# Patient Record
Sex: FEMALE | Race: WHITE | NOT HISPANIC OR LATINO | Employment: PART TIME | URBAN - METROPOLITAN AREA
[De-identification: names, ages, dates, MRNs, and addresses within clinical notes are randomized per-mention and may not be internally consistent; named-entity substitution may affect disease eponyms.]

---

## 2018-09-26 ENCOUNTER — HOSPITAL ENCOUNTER (OUTPATIENT)
Dept: CARDIOLOGY | Facility: HOSPITAL | Age: 68
Discharge: HOME | End: 2018-09-26

## 2018-10-15 ENCOUNTER — HOSPITAL ENCOUNTER (OUTPATIENT)
Dept: MAMMOGRAPHY | Facility: HOSPITAL | Age: 68
Discharge: HOME | End: 2018-10-15

## 2019-10-07 ENCOUNTER — TRANSCRIBE ORDERS (OUTPATIENT)
Dept: ADMINISTRATIVE | Facility: HOSPITAL | Age: 69
End: 2019-10-07

## 2019-10-07 DIAGNOSIS — Z12.39 SCREENING BREAST EXAMINATION: Primary | ICD-10-CM

## 2019-10-21 ENCOUNTER — APPOINTMENT (OUTPATIENT)
Dept: MAMMOGRAPHY | Facility: HOSPITAL | Age: 69
End: 2019-10-21

## 2019-10-21 ENCOUNTER — HOSPITAL ENCOUNTER (OUTPATIENT)
Dept: MAMMOGRAPHY | Facility: HOSPITAL | Age: 69
End: 2019-10-21

## 2019-11-04 ENCOUNTER — APPOINTMENT (OUTPATIENT)
Dept: MAMMOGRAPHY | Facility: HOSPITAL | Age: 69
End: 2019-11-04

## 2019-11-15 ENCOUNTER — HOSPITAL ENCOUNTER (OUTPATIENT)
Dept: MAMMOGRAPHY | Facility: HOSPITAL | Age: 69
Discharge: HOME | End: 2019-11-15
Admitting: INTERNAL MEDICINE

## 2019-11-15 DIAGNOSIS — Z12.39 SCREENING BREAST EXAMINATION: ICD-10-CM

## 2019-11-15 PROCEDURE — 77067 SCR MAMMO BI INCL CAD: CPT

## 2019-11-15 PROCEDURE — 77063 BREAST TOMOSYNTHESIS BI: CPT

## 2019-11-20 ENCOUNTER — TRANSCRIBE ORDERS (OUTPATIENT)
Dept: ADMINISTRATIVE | Facility: HOSPITAL | Age: 69
End: 2019-11-20

## 2019-11-20 DIAGNOSIS — M81.0 SENILE OSTEOPOROSIS: Primary | ICD-10-CM

## 2019-12-02 ENCOUNTER — HOSPITAL ENCOUNTER (OUTPATIENT)
Dept: BONE DENSITY | Facility: HOSPITAL | Age: 69
Discharge: HOME | End: 2019-12-02
Admitting: INTERNAL MEDICINE

## 2019-12-02 DIAGNOSIS — M81.0 SENILE OSTEOPOROSIS: ICD-10-CM

## 2019-12-02 PROCEDURE — 77080 DXA BONE DENSITY AXIAL: CPT

## 2020-02-06 ENCOUNTER — APPOINTMENT (OUTPATIENT)
Dept: CT IMAGING | Facility: HOSPITAL | Age: 70
End: 2020-02-06

## 2020-02-06 ENCOUNTER — HOSPITAL ENCOUNTER (INPATIENT)
Facility: HOSPITAL | Age: 70
LOS: 2 days | Discharge: HOME | End: 2020-02-08
Attending: INTERNAL MEDICINE | Admitting: INTERNAL MEDICINE

## 2020-02-06 ENCOUNTER — APPOINTMENT (OUTPATIENT)
Dept: GENERAL RADIOLOGY | Facility: HOSPITAL | Age: 70
End: 2020-02-06

## 2020-02-06 DIAGNOSIS — E22.2 SIADH (SYNDROME OF INAPPROPRIATE ADH PRODUCTION) (HCC): Primary | Chronic | ICD-10-CM

## 2020-02-06 DIAGNOSIS — J21.0 RSV (ACUTE BRONCHIOLITIS DUE TO RESPIRATORY SYNCYTIAL VIRUS): ICD-10-CM

## 2020-02-06 DIAGNOSIS — E87.1 HYPONATREMIA: ICD-10-CM

## 2020-02-06 DIAGNOSIS — R05.9 COUGH: ICD-10-CM

## 2020-02-06 DIAGNOSIS — R06.00 DYSPNEA, UNSPECIFIED TYPE: ICD-10-CM

## 2020-02-06 PROBLEM — M81.0 OSTEOPOROSIS: Status: ACTIVE | Noted: 2020-02-06

## 2020-02-06 LAB
ALBUMIN SERPL-MCNC: 4.6 G/DL (ref 3.5–5.2)
ALBUMIN/GLOB SERPL: 1.6 G/DL
ALP SERPL-CCNC: 78 U/L (ref 39–117)
ALT SERPL W P-5'-P-CCNC: 18 U/L (ref 1–33)
ANION GAP SERPL CALCULATED.3IONS-SCNC: 12 MMOL/L (ref 5–15)
ANION GAP SERPL CALCULATED.3IONS-SCNC: 13 MMOL/L (ref 5–15)
ARTERIAL PATENCY WRIST A: ABNORMAL
AST SERPL-CCNC: 39 U/L (ref 1–32)
ATMOSPHERIC PRESS: ABNORMAL MM[HG]
B PERT DNA SPEC QL NAA+PROBE: NOT DETECTED
BACTERIA UR QL AUTO: ABNORMAL /HPF
BASE EXCESS BLDA CALC-SCNC: -1.2 MMOL/L (ref 0–3)
BASOPHILS # BLD AUTO: 0 10*3/MM3 (ref 0–0.2)
BASOPHILS NFR BLD AUTO: 0.2 % (ref 0–1.5)
BDY SITE: ABNORMAL
BILIRUB SERPL-MCNC: 0.3 MG/DL (ref 0.2–1.2)
BILIRUB UR QL STRIP: NEGATIVE
BUN BLD-MCNC: 12 MG/DL (ref 8–23)
BUN BLD-MCNC: 12 MG/DL (ref 8–23)
BUN/CREAT SERPL: 18.8 (ref 7–25)
BUN/CREAT SERPL: 20.7 (ref 7–25)
C PNEUM DNA NPH QL NAA+NON-PROBE: NOT DETECTED
CALCIUM SPEC-SCNC: 8.8 MG/DL (ref 8.6–10.5)
CALCIUM SPEC-SCNC: 9.6 MG/DL (ref 8.6–10.5)
CHLORIDE SERPL-SCNC: 85 MMOL/L (ref 98–107)
CHLORIDE SERPL-SCNC: 91 MMOL/L (ref 98–107)
CLARITY UR: CLEAR
CO2 BLDA-SCNC: 24.6 MMOL/L (ref 22–29)
CO2 SERPL-SCNC: 23 MMOL/L (ref 22–29)
CO2 SERPL-SCNC: 25 MMOL/L (ref 22–29)
COLOR UR: YELLOW
CORTIS SERPL-MCNC: 12.03 MCG/DL
CREAT BLD-MCNC: 0.58 MG/DL (ref 0.57–1)
CREAT BLD-MCNC: 0.64 MG/DL (ref 0.57–1)
CRP SERPL-MCNC: 2.82 MG/DL (ref 0–0.5)
D-LACTATE SERPL-SCNC: 1.1 MMOL/L (ref 0.5–2)
DEPRECATED RDW RBC AUTO: 45.5 FL (ref 37–54)
EOSINOPHIL # BLD AUTO: 0 10*3/MM3 (ref 0–0.4)
EOSINOPHIL NFR BLD AUTO: 0 % (ref 0.3–6.2)
ERYTHROCYTE [DISTWIDTH] IN BLOOD BY AUTOMATED COUNT: 13.1 % (ref 12.3–15.4)
FLUAV H1 2009 PAND RNA NPH QL NAA+PROBE: NOT DETECTED
FLUAV H1 HA GENE NPH QL NAA+PROBE: NOT DETECTED
FLUAV H3 RNA NPH QL NAA+PROBE: NOT DETECTED
FLUAV SUBTYP SPEC NAA+PROBE: NOT DETECTED
FLUBV RNA ISLT QL NAA+PROBE: NOT DETECTED
GFR SERPL CREATININE-BSD FRML MDRD: 102 ML/MIN/1.73
GFR SERPL CREATININE-BSD FRML MDRD: 91 ML/MIN/1.73
GLOBULIN UR ELPH-MCNC: 2.9 GM/DL
GLUCOSE BLD-MCNC: 118 MG/DL (ref 65–99)
GLUCOSE BLD-MCNC: 171 MG/DL (ref 65–99)
GLUCOSE UR STRIP-MCNC: NEGATIVE MG/DL
HADV DNA SPEC NAA+PROBE: NOT DETECTED
HCO3 BLDA-SCNC: 23.4 MMOL/L (ref 21–28)
HCOV 229E RNA SPEC QL NAA+PROBE: NOT DETECTED
HCOV HKU1 RNA SPEC QL NAA+PROBE: NOT DETECTED
HCOV NL63 RNA SPEC QL NAA+PROBE: NOT DETECTED
HCOV OC43 RNA SPEC QL NAA+PROBE: NOT DETECTED
HCT VFR BLD AUTO: 41.4 % (ref 34–46.6)
HEMODILUTION: NO
HGB BLD-MCNC: 14.4 G/DL (ref 12–15.9)
HGB UR QL STRIP.AUTO: ABNORMAL
HMPV RNA NPH QL NAA+NON-PROBE: NOT DETECTED
HOROWITZ INDEX BLD+IHG-RTO: 28 %
HPIV1 RNA SPEC QL NAA+PROBE: NOT DETECTED
HPIV2 RNA SPEC QL NAA+PROBE: NOT DETECTED
HPIV3 RNA NPH QL NAA+PROBE: NOT DETECTED
HPIV4 P GENE NPH QL NAA+PROBE: NOT DETECTED
HYALINE CASTS UR QL AUTO: ABNORMAL /LPF
KETONES UR QL STRIP: ABNORMAL
L PNEUMO1 AG UR QL IA: NEGATIVE
LEUKOCYTE ESTERASE UR QL STRIP.AUTO: NEGATIVE
LYMPHOCYTES # BLD AUTO: 0.6 10*3/MM3 (ref 0.7–3.1)
LYMPHOCYTES NFR BLD AUTO: 4.4 % (ref 19.6–45.3)
M PNEUMO IGG SER IA-ACNC: NOT DETECTED
MCH RBC QN AUTO: 34.3 PG (ref 26.6–33)
MCHC RBC AUTO-ENTMCNC: 34.7 G/DL (ref 31.5–35.7)
MCV RBC AUTO: 98.9 FL (ref 79–97)
MODALITY: ABNORMAL
MONOCYTES # BLD AUTO: 1.2 10*3/MM3 (ref 0.1–0.9)
MONOCYTES NFR BLD AUTO: 9.5 % (ref 5–12)
NEUTROPHILS # BLD AUTO: 10.8 10*3/MM3 (ref 1.7–7)
NEUTROPHILS NFR BLD AUTO: 85.9 % (ref 42.7–76)
NITRITE UR QL STRIP: NEGATIVE
NRBC BLD AUTO-RTO: 0 /100 WBC (ref 0–0.2)
NT-PROBNP SERPL-MCNC: 852.9 PG/ML (ref 5–900)
OSMOLALITY SERPL: 260 MOSM/KG (ref 280–300)
OSMOLALITY UR: 314 MOSM/KG (ref 300–800)
PCO2 BLDA: 38.1 MM HG (ref 35–48)
PH BLDA: 7.4 PH UNITS (ref 7.35–7.45)
PH UR STRIP.AUTO: 6.5 [PH] (ref 5–8)
PLATELET # BLD AUTO: 307 10*3/MM3 (ref 140–450)
PMV BLD AUTO: 6.9 FL (ref 6–12)
PO2 BLDA: 81.9 MM HG (ref 83–108)
POTASSIUM BLD-SCNC: 4.1 MMOL/L (ref 3.5–5.2)
POTASSIUM BLD-SCNC: 4.2 MMOL/L (ref 3.5–5.2)
PROCALCITONIN SERPL-MCNC: 0.09 NG/ML (ref 0.1–0.25)
PROT SERPL-MCNC: 7.5 G/DL (ref 6–8.5)
PROT UR QL STRIP: ABNORMAL
RBC # BLD AUTO: 4.19 10*6/MM3 (ref 3.77–5.28)
RBC # UR: ABNORMAL /HPF
REF LAB TEST METHOD: ABNORMAL
RHINOVIRUS RNA SPEC NAA+PROBE: NOT DETECTED
RSV RNA NPH QL NAA+NON-PROBE: DETECTED
SAO2 % BLDCOA: 96 % (ref 94–98)
SODIUM BLD-SCNC: 122 MMOL/L (ref 136–145)
SODIUM BLD-SCNC: 127 MMOL/L (ref 136–145)
SODIUM UR-SCNC: 28 MMOL/L
SP GR UR STRIP: 1.01 (ref 1–1.03)
SQUAMOUS #/AREA URNS HPF: ABNORMAL /HPF
TROPONIN T SERPL-MCNC: <0.01 NG/ML (ref 0–0.03)
TROPONIN T SERPL-MCNC: <0.01 NG/ML (ref 0–0.03)
TSH SERPL DL<=0.05 MIU/L-ACNC: 0.59 UIU/ML (ref 0.27–4.2)
URATE SERPL-MCNC: 1.7 MG/DL (ref 2.4–5.7)
UROBILINOGEN UR QL STRIP: ABNORMAL
WBC NRBC COR # BLD: 12.6 10*3/MM3 (ref 3.4–10.8)
WBC UR QL AUTO: ABNORMAL /HPF

## 2020-02-06 PROCEDURE — 25010000002 AZITHROMYCIN PER 500 MG: Performed by: PHYSICIAN ASSISTANT

## 2020-02-06 PROCEDURE — 84145 PROCALCITONIN (PCT): CPT | Performed by: PHYSICIAN ASSISTANT

## 2020-02-06 PROCEDURE — 82803 BLOOD GASES ANY COMBINATION: CPT

## 2020-02-06 PROCEDURE — 84550 ASSAY OF BLOOD/URIC ACID: CPT | Performed by: INTERNAL MEDICINE

## 2020-02-06 PROCEDURE — 83935 ASSAY OF URINE OSMOLALITY: CPT | Performed by: PHYSICIAN ASSISTANT

## 2020-02-06 PROCEDURE — 81001 URINALYSIS AUTO W/SCOPE: CPT | Performed by: INTERNAL MEDICINE

## 2020-02-06 PROCEDURE — 93005 ELECTROCARDIOGRAM TRACING: CPT | Performed by: PHYSICIAN ASSISTANT

## 2020-02-06 PROCEDURE — 84484 ASSAY OF TROPONIN QUANT: CPT | Performed by: PHYSICIAN ASSISTANT

## 2020-02-06 PROCEDURE — 99223 1ST HOSP IP/OBS HIGH 75: CPT | Performed by: INTERNAL MEDICINE

## 2020-02-06 PROCEDURE — 36600 WITHDRAWAL OF ARTERIAL BLOOD: CPT

## 2020-02-06 PROCEDURE — 94799 UNLISTED PULMONARY SVC/PX: CPT

## 2020-02-06 PROCEDURE — 84443 ASSAY THYROID STIM HORMONE: CPT | Performed by: INTERNAL MEDICINE

## 2020-02-06 PROCEDURE — 84165 PROTEIN E-PHORESIS SERUM: CPT | Performed by: INTERNAL MEDICINE

## 2020-02-06 PROCEDURE — 71250 CT THORAX DX C-: CPT

## 2020-02-06 PROCEDURE — 83930 ASSAY OF BLOOD OSMOLALITY: CPT | Performed by: INTERNAL MEDICINE

## 2020-02-06 PROCEDURE — 87040 BLOOD CULTURE FOR BACTERIA: CPT

## 2020-02-06 PROCEDURE — 87899 AGENT NOS ASSAY W/OPTIC: CPT | Performed by: INTERNAL MEDICINE

## 2020-02-06 PROCEDURE — 25010000002 METHYLPREDNISOLONE PER 125 MG: Performed by: PHYSICIAN ASSISTANT

## 2020-02-06 PROCEDURE — 93005 ELECTROCARDIOGRAM TRACING: CPT | Performed by: INTERNAL MEDICINE

## 2020-02-06 PROCEDURE — 99284 EMERGENCY DEPT VISIT MOD MDM: CPT

## 2020-02-06 PROCEDURE — 83605 ASSAY OF LACTIC ACID: CPT

## 2020-02-06 PROCEDURE — 84300 ASSAY OF URINE SODIUM: CPT | Performed by: PHYSICIAN ASSISTANT

## 2020-02-06 PROCEDURE — 71045 X-RAY EXAM CHEST 1 VIEW: CPT

## 2020-02-06 PROCEDURE — 85025 COMPLETE CBC W/AUTO DIFF WBC: CPT

## 2020-02-06 PROCEDURE — 0099U HC BIOFIRE FILMARRAY RESP PANEL 1: CPT | Performed by: PHYSICIAN ASSISTANT

## 2020-02-06 PROCEDURE — 94640 AIRWAY INHALATION TREATMENT: CPT

## 2020-02-06 PROCEDURE — 82533 TOTAL CORTISOL: CPT | Performed by: INTERNAL MEDICINE

## 2020-02-06 PROCEDURE — 25010000002 CEFTRIAXONE PER 250 MG: Performed by: PHYSICIAN ASSISTANT

## 2020-02-06 PROCEDURE — 80053 COMPREHEN METABOLIC PANEL: CPT

## 2020-02-06 PROCEDURE — 83880 ASSAY OF NATRIURETIC PEPTIDE: CPT | Performed by: PHYSICIAN ASSISTANT

## 2020-02-06 PROCEDURE — 86140 C-REACTIVE PROTEIN: CPT | Performed by: PHYSICIAN ASSISTANT

## 2020-02-06 PROCEDURE — 93005 ELECTROCARDIOGRAM TRACING: CPT

## 2020-02-06 RX ORDER — CHOLECALCIFEROL (VITAMIN D3) 125 MCG
500 CAPSULE ORAL DAILY
COMMUNITY

## 2020-02-06 RX ORDER — MAGNESIUM SULFATE HEPTAHYDRATE 40 MG/ML
2 INJECTION, SOLUTION INTRAVENOUS AS NEEDED
Status: DISCONTINUED | OUTPATIENT
Start: 2020-02-06 | End: 2020-02-06

## 2020-02-06 RX ORDER — CALCIUM CARBONATE 200(500)MG
2 TABLET,CHEWABLE ORAL 2 TIMES DAILY PRN
Status: DISCONTINUED | OUTPATIENT
Start: 2020-02-06 | End: 2020-02-08 | Stop reason: HOSPADM

## 2020-02-06 RX ORDER — GUAIFENESIN AND CODEINE PHOSPHATE 100; 10 MG/5ML; MG/5ML
5 SOLUTION ORAL EVERY 4 HOURS PRN
Status: DISCONTINUED | OUTPATIENT
Start: 2020-02-06 | End: 2020-02-08 | Stop reason: HOSPADM

## 2020-02-06 RX ORDER — DOCUSATE SODIUM 100 MG/1
100 CAPSULE, LIQUID FILLED ORAL 2 TIMES DAILY PRN
Status: DISCONTINUED | OUTPATIENT
Start: 2020-02-06 | End: 2020-02-08 | Stop reason: HOSPADM

## 2020-02-06 RX ORDER — SODIUM CHLORIDE 0.9 % (FLUSH) 0.9 %
10 SYRINGE (ML) INJECTION AS NEEDED
Status: DISCONTINUED | OUTPATIENT
Start: 2020-02-06 | End: 2020-02-08 | Stop reason: HOSPADM

## 2020-02-06 RX ORDER — ONDANSETRON 2 MG/ML
4 INJECTION INTRAMUSCULAR; INTRAVENOUS EVERY 6 HOURS PRN
Status: DISCONTINUED | OUTPATIENT
Start: 2020-02-06 | End: 2020-02-08 | Stop reason: HOSPADM

## 2020-02-06 RX ORDER — BENZONATATE 100 MG/1
100 CAPSULE ORAL 3 TIMES DAILY PRN
Status: DISCONTINUED | OUTPATIENT
Start: 2020-02-06 | End: 2020-02-06

## 2020-02-06 RX ORDER — BENZONATATE 100 MG/1
200 CAPSULE ORAL 4 TIMES DAILY
Status: DISCONTINUED | OUTPATIENT
Start: 2020-02-06 | End: 2020-02-08 | Stop reason: HOSPADM

## 2020-02-06 RX ORDER — PREDNISONE 10 MG/1
10 TABLET ORAL TAKE AS DIRECTED
Status: ON HOLD | COMMUNITY
End: 2020-02-08 | Stop reason: SDUPTHER

## 2020-02-06 RX ORDER — BISACODYL 10 MG
10 SUPPOSITORY, RECTAL RECTAL DAILY PRN
Status: DISCONTINUED | OUTPATIENT
Start: 2020-02-06 | End: 2020-02-08 | Stop reason: HOSPADM

## 2020-02-06 RX ORDER — ALUMINA, MAGNESIA, AND SIMETHICONE 2400; 2400; 240 MG/30ML; MG/30ML; MG/30ML
15 SUSPENSION ORAL EVERY 6 HOURS PRN
Status: DISCONTINUED | OUTPATIENT
Start: 2020-02-06 | End: 2020-02-06

## 2020-02-06 RX ORDER — DOXYCYCLINE HYCLATE 100 MG/1
100 CAPSULE ORAL 2 TIMES DAILY
COMMUNITY
End: 2020-02-08 | Stop reason: HOSPADM

## 2020-02-06 RX ORDER — ONDANSETRON 4 MG/1
4 TABLET, FILM COATED ORAL EVERY 6 HOURS PRN
Status: DISCONTINUED | OUTPATIENT
Start: 2020-02-06 | End: 2020-02-08 | Stop reason: HOSPADM

## 2020-02-06 RX ORDER — METHYLPREDNISOLONE SODIUM SUCCINATE 125 MG/2ML
125 INJECTION, POWDER, LYOPHILIZED, FOR SOLUTION INTRAMUSCULAR; INTRAVENOUS ONCE
Status: COMPLETED | OUTPATIENT
Start: 2020-02-06 | End: 2020-02-06

## 2020-02-06 RX ORDER — SODIUM CHLORIDE 0.9 % (FLUSH) 0.9 %
10 SYRINGE (ML) INJECTION EVERY 12 HOURS SCHEDULED
Status: DISCONTINUED | OUTPATIENT
Start: 2020-02-06 | End: 2020-02-08 | Stop reason: HOSPADM

## 2020-02-06 RX ORDER — ACETAMINOPHEN 160 MG/5ML
650 SOLUTION ORAL EVERY 4 HOURS PRN
Status: DISCONTINUED | OUTPATIENT
Start: 2020-02-06 | End: 2020-02-08 | Stop reason: HOSPADM

## 2020-02-06 RX ORDER — POTASSIUM CHLORIDE 20 MEQ/1
40 TABLET, EXTENDED RELEASE ORAL AS NEEDED
Status: DISCONTINUED | OUTPATIENT
Start: 2020-02-06 | End: 2020-02-06

## 2020-02-06 RX ORDER — ALENDRONATE SODIUM 70 MG/1
70 TABLET ORAL WEEKLY
Status: DISCONTINUED | OUTPATIENT
Start: 2020-02-06 | End: 2020-02-08 | Stop reason: HOSPADM

## 2020-02-06 RX ORDER — ALENDRONATE SODIUM 70 MG/1
70 TABLET ORAL
COMMUNITY

## 2020-02-06 RX ORDER — IPRATROPIUM BROMIDE AND ALBUTEROL SULFATE 2.5; .5 MG/3ML; MG/3ML
3 SOLUTION RESPIRATORY (INHALATION) ONCE
Status: COMPLETED | OUTPATIENT
Start: 2020-02-06 | End: 2020-02-06

## 2020-02-06 RX ORDER — CHOLECALCIFEROL (VITAMIN D3) 125 MCG
5 CAPSULE ORAL NIGHTLY PRN
Status: DISCONTINUED | OUTPATIENT
Start: 2020-02-06 | End: 2020-02-08 | Stop reason: HOSPADM

## 2020-02-06 RX ORDER — CITALOPRAM 20 MG/1
40 TABLET ORAL DAILY
Status: DISCONTINUED | OUTPATIENT
Start: 2020-02-06 | End: 2020-02-06

## 2020-02-06 RX ORDER — LORAZEPAM 0.5 MG/1
0.5 TABLET ORAL EVERY 6 HOURS PRN
Status: DISCONTINUED | OUTPATIENT
Start: 2020-02-06 | End: 2020-02-08 | Stop reason: HOSPADM

## 2020-02-06 RX ORDER — PREDNISONE 20 MG/1
40 TABLET ORAL
Status: DISCONTINUED | OUTPATIENT
Start: 2020-02-07 | End: 2020-02-08 | Stop reason: HOSPADM

## 2020-02-06 RX ORDER — IPRATROPIUM BROMIDE AND ALBUTEROL SULFATE 2.5; .5 MG/3ML; MG/3ML
3 SOLUTION RESPIRATORY (INHALATION) EVERY 6 HOURS PRN
Status: DISCONTINUED | OUTPATIENT
Start: 2020-02-06 | End: 2020-02-06

## 2020-02-06 RX ORDER — CITALOPRAM 40 MG/1
40 TABLET ORAL DAILY
COMMUNITY
Start: 2019-11-01 | End: 2020-02-08 | Stop reason: HOSPADM

## 2020-02-06 RX ORDER — MAGNESIUM SULFATE HEPTAHYDRATE 40 MG/ML
4 INJECTION, SOLUTION INTRAVENOUS AS NEEDED
Status: DISCONTINUED | OUTPATIENT
Start: 2020-02-06 | End: 2020-02-06

## 2020-02-06 RX ORDER — HYDRALAZINE HYDROCHLORIDE 20 MG/ML
10 INJECTION INTRAMUSCULAR; INTRAVENOUS EVERY 6 HOURS PRN
Status: DISCONTINUED | OUTPATIENT
Start: 2020-02-06 | End: 2020-02-08 | Stop reason: HOSPADM

## 2020-02-06 RX ORDER — ACETAMINOPHEN 325 MG/1
650 TABLET ORAL EVERY 4 HOURS PRN
Status: DISCONTINUED | OUTPATIENT
Start: 2020-02-06 | End: 2020-02-08 | Stop reason: HOSPADM

## 2020-02-06 RX ORDER — ALBUTEROL SULFATE 2.5 MG/3ML
2.5 SOLUTION RESPIRATORY (INHALATION) ONCE
Status: COMPLETED | OUTPATIENT
Start: 2020-02-06 | End: 2020-02-06

## 2020-02-06 RX ORDER — TOLVAPTAN 15 MG/1
15 TABLET ORAL ONCE
Status: COMPLETED | OUTPATIENT
Start: 2020-02-06 | End: 2020-02-06

## 2020-02-06 RX ORDER — ACETAMINOPHEN 650 MG/1
650 SUPPOSITORY RECTAL EVERY 4 HOURS PRN
Status: DISCONTINUED | OUTPATIENT
Start: 2020-02-06 | End: 2020-02-08 | Stop reason: HOSPADM

## 2020-02-06 RX ORDER — TRAZODONE HYDROCHLORIDE 50 MG/1
50 TABLET ORAL DAILY
COMMUNITY
Start: 2019-11-01 | End: 2020-02-08 | Stop reason: HOSPADM

## 2020-02-06 RX ORDER — IPRATROPIUM BROMIDE AND ALBUTEROL SULFATE 2.5; .5 MG/3ML; MG/3ML
3 SOLUTION RESPIRATORY (INHALATION) EVERY 4 HOURS PRN
Status: DISCONTINUED | OUTPATIENT
Start: 2020-02-06 | End: 2020-02-08 | Stop reason: HOSPADM

## 2020-02-06 RX ORDER — BENZONATATE 100 MG/1
100 CAPSULE ORAL 3 TIMES DAILY PRN
COMMUNITY

## 2020-02-06 RX ORDER — POTASSIUM CHLORIDE 1.5 G/1.77G
40 POWDER, FOR SOLUTION ORAL AS NEEDED
Status: DISCONTINUED | OUTPATIENT
Start: 2020-02-06 | End: 2020-02-06

## 2020-02-06 RX ORDER — HEPARIN SODIUM 5000 [USP'U]/ML
5000 INJECTION, SOLUTION INTRAVENOUS; SUBCUTANEOUS EVERY 12 HOURS SCHEDULED
Status: DISCONTINUED | OUTPATIENT
Start: 2020-02-06 | End: 2020-02-08 | Stop reason: HOSPADM

## 2020-02-06 RX ADMIN — CEFTRIAXONE SODIUM 2 G: 10 INJECTION, POWDER, FOR SOLUTION INTRAVENOUS at 13:30

## 2020-02-06 RX ADMIN — Medication 10 ML: at 21:25

## 2020-02-06 RX ADMIN — METHYLPREDNISOLONE SODIUM SUCCINATE 125 MG: 125 INJECTION, POWDER, FOR SOLUTION INTRAMUSCULAR; INTRAVENOUS at 11:01

## 2020-02-06 RX ADMIN — ALBUTEROL SULFATE 2.5 MG: 2.5 SOLUTION RESPIRATORY (INHALATION) at 13:39

## 2020-02-06 RX ADMIN — TOLVAPTAN 15 MG: 15 TABLET ORAL at 16:00

## 2020-02-06 RX ADMIN — IPRATROPIUM BROMIDE AND ALBUTEROL SULFATE 3 ML: .5; 3 SOLUTION RESPIRATORY (INHALATION) at 10:40

## 2020-02-06 RX ADMIN — CYANOCOBALAMIN TAB 250 MCG 500 MCG: 250 TAB at 17:00

## 2020-02-06 RX ADMIN — OYSTER SHELL CALCIUM WITH VITAMIN D 1 TABLET: 500; 200 TABLET, FILM COATED ORAL at 18:00

## 2020-02-06 RX ADMIN — AZITHROMYCIN MONOHYDRATE 500 MG: 500 INJECTION, POWDER, LYOPHILIZED, FOR SOLUTION INTRAVENOUS at 13:30

## 2020-02-06 RX ADMIN — ACETAMINOPHEN 650 MG: 325 TABLET, FILM COATED ORAL at 19:54

## 2020-02-06 RX ADMIN — ALBUTEROL SULFATE 2.5 MG: 2.5 SOLUTION RESPIRATORY (INHALATION) at 10:40

## 2020-02-06 RX ADMIN — BENZONATATE 200 MG: 100 CAPSULE ORAL at 19:54

## 2020-02-06 RX ADMIN — SODIUM CHLORIDE 1000 ML: 900 INJECTION, SOLUTION INTRAVENOUS at 13:40

## 2020-02-06 NOTE — NURSING NOTE
Post void residual was 20mL.   Pt on strict I & O   Will give in report jazmin that Dr. Andres is to be called with NA results and changes listed under the parameters found in the nursing communication order. [phone]

## 2020-02-06 NOTE — H&P
[facility] Medicine Services    Patient Name: Akila Bates  : 1950  MRN: [id number]  Primary Care Physician: Nikki Rosado  Date of admission: 2020    Patient Care Team:  Nikki Rosado as PCP - General  Ronda Corbin MD as Consulting Physician (Nephrology)    Subjective   History Present Illness     Chief Complaint:   Chief Complaint   Patient presents with   • Shortness of Breath     Ms. Bates is a 69 y.o. with a past medical history of anxiety/depression and HLD who presents to [facility]  complaining of dyspnea and weakness x 1 week, worsening over the last two days.  She states she was seen at urgent care 2 days ago and diagnosed with bronchitis.  She reports a dry cough, but denies known fever or chills. She denies known sick contacts.  She denies home oxygen use.      In the ED, the patient's labs included the following: Na 122, K 4.1, BUN 12, Cr 0.64, glucose 118, WBC 12.6, hgb 14.4, plts 307.  Troponin negative.  .  Lactate negative.  CXR: negative.  EKG: SR, HR 90.  Respiratory panel positive for RSV.  She was given breathing treatments, a dose of rocephin/zithromax and 1 L NS bolus in the ED.  She was admitted for further evaluation and management.     History of Present Illness    Review of Systems   Constitution: Negative for chills and fever.   Cardiovascular: Negative for chest pain.   Respiratory: Positive for shortness of breath.    Gastrointestinal: Negative for nausea and vomiting.   Neurological: Positive for weakness. Negative for dizziness.   All other systems reviewed and are negative.    Personal History     Past Medical History:   Past Medical History:   Diagnosis Date   • Osteoporosis        Surgical History:      Past Surgical History:   Procedure Laterality Date   • HYSTERECTOMY             Family History: family history is not on file. Otherwise pertinent FHx was reviewed and unremarkable.      Social History:  reports that she quit smoking today. Her smoking use included cigarettes. She smoked 1.00 pack per day. She has never used smokeless tobacco. She reports that she drank alcohol. She reports that she does not use drugs.      Medications:  Prior to Admission medications    Medication Sig Start Date End Date Taking? Authorizing Provider   alendronate (FOSAMAX) 70 MG tablet Take 70 mg by mouth Every 7 (Seven) Days. Every Thursday   Yes Germania Tran MD   benzonatate (TESSALON) 100 MG capsule Take 100 mg by mouth 3 (Three) Times a Day As Needed for Cough.   Yes Germania Tran MD   Calcium Carb-Cholecalciferol (CALCIUM-VITAMIN D) 500-200 MG-UNIT per tablet Take 1 tablet by mouth Daily.   Yes Germania Tran MD   citalopram (CeleXA) 40 MG tablet Take 40 mg by mouth Daily. 11/1/19  Yes Germania Tran MD   doxycycline (VIBRAMYCIN) 100 MG capsule Take 100 mg by mouth 2 (Two) Times a Day.   Yes Germania Tran MD   predniSONE (DELTASONE) 10 MG tablet Take 10 mg by mouth Take As Directed.   Yes Germania Tran MD   traZODone (DESYREL) 50 MG tablet Take 50 mg by mouth Daily. 11/1/19  Yes Germania Tran MD   vitamin B-12 (CYANOCOBALAMIN) 500 MCG tablet Take 500 mcg by mouth Daily.   Yes Germania Tran MD       Allergies:  No Known Allergies    Objective   Objective     Vital Signs  Temp:  [98.4 °F (36.9 °C)-98.6 °F (37 °C)] 98.4 °F (36.9 °C)  Heart Rate:  [] 97  Resp:  [24-26] 24  BP: (139-183)/(75-98) 159/82  SpO2:  [88 %-100 %] 98 %  on  Flow (L/min):  [2-3] 3;   Device (Oxygen Therapy): nasal cannula  Body mass index is 22.48 kg/m².    Physical Exam   Constitutional: She is oriented to person, place, and time. She appears well-developed and well-nourished. No distress.   HENT:   Head: Normocephalic and atraumatic.   Eyes: Pupils are equal, round, and reactive to light. EOM are normal.   Neck: Normal range of motion. Neck supple.   Cardiovascular:  Normal rate, regular rhythm and normal heart sounds. Exam reveals no gallop and no friction rub.   No murmur heard.  Pulmonary/Chest: Effort normal. No respiratory distress. She has wheezes (bilateral).   O2 @ 3L via NC   Abdominal: Soft. Bowel sounds are normal. She exhibits no distension. There is no tenderness. There is no guarding.   Musculoskeletal: Normal range of motion. She exhibits no edema or deformity.   Neurological: She is alert and oriented to person, place, and time.   Skin: Skin is warm and dry. She is not diaphoretic.   Psychiatric: She has a normal mood and affect.   Vitals reviewed.    Results Review:    Results from last 7 days   Lab Units 02/06/20  1046   WBC 10*3/mm3 12.60*   HEMOGLOBIN g/dL 14.4   HEMATOCRIT % 41.4   PLATELETS 10*3/mm3 307     Results from last 7 days   Lab Units 02/06/20  1046 02/06/20  1044   SODIUM mmol/L 122*  --    POTASSIUM mmol/L 4.1  --    CHLORIDE mmol/L 85*  --    CO2 mmol/L 25.0  --    BUN mg/dL 12  --    CREATININE mg/dL 0.64  --    GLUCOSE mg/dL 118*  --    CALCIUM mg/dL 9.6  --    ALT (SGPT) U/L 18  --    AST (SGOT) U/L 39*  --    TROPONIN T ng/mL <0.010  --    PROBNP pg/mL 852.9  --    LACTATE mmol/L  --  1.1     Estimated Creatinine Clearance: 55 mL/min (by C-G formula based on SCr of 0.64 mg/dL).  Brief Urine Lab Results  (Last result in the past 365 days)      Color   Clarity   Blood   Leuk Est   Nitrite   Protein   CREAT   Urine HCG        02/06/20 1523 Yellow Clear Small (1+) Negative Negative Trace               Microbiology Results (last 10 days)     Procedure Component Value - Date/Time    Respiratory Panel, PCR - Swab, Nasopharynx [[id number]]  (Abnormal) Collected:  02/06/20 1053    Lab Status:  Final result Specimen:  Swab from Nasopharynx Updated:  02/06/20 1240     ADENOVIRUS, PCR Not Detected     Coronavirus 229E Not Detected     Coronavirus HKU1 Not Detected     Coronavirus NL63 Not Detected     Coronavirus OC43 Not Detected     Human  Metapneumovirus Not Detected     Human Rhinovirus/Enterovirus Not Detected     Influenza B PCR Not Detected     Parainfluenza Virus 1 Not Detected     Parainfluenza Virus 2 Not Detected     Parainfluenza Virus 3 Not Detected     Parainfluenza Virus 4 Not Detected     Bordetella pertussis pcr Not Detected     Influenza A H1 2009 PCR Not Detected     Chlamydophila pneumoniae PCR Not Detected     Mycoplasma pneumo by PCR Not Detected     Influenza A PCR Not Detected     Influenza A H3 Not Detected     Influenza A H1 Not Detected     RSV, PCR Detected          ECG/EMG Results (most recent)     Procedure Component Value Units Date/Time    ECG 12 Lead [[id number]] Collected:  02/06/20 1101     Updated:  02/06/20 1102    Narrative:       HEART RATE= 90  bpm  RR Interval= 668  ms  [place] Interval= 154  ms  P Horizontal Axis= 13  deg  P Front Axis= 37  deg  QRSD Interval= 80  ms  QT Interval= 343  ms  QRS Axis= 52  deg  T Wave Axis= 57  deg  - BORDERLINE ECG -  Sinus rhythm  Consider anterior infarct  No previous ECG available for comparison  Electronically Signed By:   Date and Time of Study: 2020-02-06 11:01:22                    Xr Chest 1 View    Result Date: 2/6/2020  1.No evidence for acute cardiopulmonary process.  Electronically Signed By-Bennie Whiteside On:2/6/2020 12:02 PM This report was finalized on [id number] by  Bennie Whiteside, .        Estimated Creatinine Clearance: 55 mL/min (by C-G formula based on SCr of 0.64 mg/dL).    Assessment/Plan   Assessment/Plan       Active Hospital Problems    Diagnosis  POA   • **RSV (acute bronchiolitis due to respiratory syncytial virus) [J21.0]  Yes     Priority: High   • Hyponatremia [E87.1]  Unknown     Priority: Medium   • Depressive disorder [F32.9]  Yes   • Anxiety state [F41.1]  Yes      Resolved Hospital Problems   No resolved problems to display.     Acute dyspnea   - secondary to RSV   - CXR: negative  - EKG: SR, HR 90  - respiratory panel positive for RSV  - troponin  negative, check serial troponin   - , repeat in AM   - lactate negative   - procalcitonin and CRP pending   - patient given breathing treatments in ED, continue   - patient given Rocephin/Zithromax, do not continue anymore at this time   - IV solu-medrol 125 mg x 1 in ED, continue with prednisone 40 mg daily x 5 days  - regular diet, fluid restriction 1000 cc/day     Recent bronchitis  - continue home tessalon perles   - defer home doxycycline and prednisone     Acute hyponatremia (122)  - etiology unclear   - patient given 1 L NS bolus in ED   - nephrology consulted     Hyperlipidemia  - no home meds reported     Anxiety / Depression   - celexa on hold     VTE Prophylaxis - Heparin, SCDs     Mechanical Order History:      Ordered        02/06/20 1559  Place Sequential Compression Device  Once         02/06/20 1559  Maintain Sequential Compression Device  Continuous                 Pharmalogical Order History:     Ordered     Dose Route Frequency Stop    02/06/20 1553  heparin (porcine) 5000 UNIT/ML injection 5,000 Units      5,000 Units SC Every 12 Hours Scheduled --          CODE STATUS:    Code Status and Medical Interventions:   Ordered at: 02/06/20 1534     Code Status:    CPR     Medical Interventions (Level of Support Prior to Arrest):    Full       Admission Status:  I believe this patient meets inpatient criteria.    I discussed the patient's findings and my recommendations with patient.    Electronically signed by Christine Pineda PA-C, 02/06/20, 4:53 PM.  [facility] Hospitalist Team

## 2020-02-06 NOTE — NURSING NOTE
Pt brought to floor from ER. Very anxious upon assessment. O2 is within normal limits on 2L NC. Pt was provided w/reassurance.

## 2020-02-06 NOTE — CONSULTS
NEPHROLOGY CONSULTATION-----[facility]    [facility]  [phone]  Chris Corbin MD    Patient Care Team:  Nikki Rosado as PCP - Ronda Molina MD as Consulting Physician (Nephrology)    CC/REASON FOR CONSULTATION: HYPONATREMIA  PHYSICIAN REQUESTING CONSULTATION:     History of Present Illness     HPI    Patient is a 69 y.o. WF  whom I was asked to see in consultation for evaluation and management of hyponatremia. Patient was admitted with progressive shortness of breath and cough, and was subsequently found to have severe hyponatremia.  Patient denies prior knowledge of functional kidney disease, proteinuria, or hematuria. No NSAIDs or recent IV dye exposure. No known h/o hepatitis, TB, rheumatic fever, jaundice, SLE, bleeding/bruising disorders.  No urinary sx. No edema or fluid retention.  +Compliance with home meds.  No herbal med use.      Review of Systems   Constitutional: Positive for activity change and fatigue. Negative for appetite change, chills, diaphoresis, fever and unexpected weight change.   HENT: Negative for congestion, dental problem, drooling, ear discharge, ear pain, facial swelling, hearing loss, mouth sores, nosebleeds, postnasal drip, rhinorrhea, sinus pressure, sinus pain, sneezing, sore throat, tinnitus, trouble swallowing and voice change.    Eyes: Negative for photophobia, pain, discharge, redness, itching and visual disturbance.   Respiratory: Positive for cough, shortness of breath and wheezing. Negative for apnea, choking, chest tightness and stridor.    Cardiovascular: Negative for chest pain, palpitations and leg swelling.   Gastrointestinal: Positive for nausea. Negative for abdominal distention, abdominal pain, anal bleeding, blood in stool, constipation, diarrhea, rectal pain and vomiting.   Endocrine: Negative for cold intolerance, heat intolerance, polydipsia, polyphagia and polyuria.    Genitourinary: Negative for decreased urine volume, difficulty urinating, dysuria, enuresis, flank pain, frequency, genital sores, hematuria and urgency.   Musculoskeletal: Positive for arthralgias. Negative for back pain, gait problem, joint swelling, myalgias, neck pain and neck stiffness.   Skin: Negative for color change, pallor, rash and wound.   Allergic/Immunologic: Negative for environmental allergies, food allergies and immunocompromised state.   Neurological: Negative for dizziness, tremors, seizures, syncope, facial asymmetry, speech difficulty, weakness, light-headedness, numbness and headaches.   Hematological: Negative for adenopathy. Does not bruise/bleed easily.   Psychiatric/Behavioral: Positive for agitation. Negative for behavioral problems, confusion, decreased concentration, dysphoric mood, hallucinations, self-injury, sleep disturbance and suicidal ideas. The patient is nervous/anxious. The patient is not hyperactive.           Past Medical History:   Diagnosis Date   • Osteoporosis        Past Surgical History:   Procedure Laterality Date   • HYSTERECTOMY  1988       No family history on file.    Social History     Tobacco Use   • Smoking status: Former Smoker     Packs/day: 1.00     Types: Cigarettes     Last attempt to quit: 2/6/2020   • Smokeless tobacco: Never Used   Substance Use Topics   • Alcohol use: Not Currently   • Drug use: Never       Home Meds:   Medications Prior to Admission   Medication Sig Dispense Refill Last Dose   • alendronate (FOSAMAX) 70 MG tablet Take 70 mg by mouth Every 7 (Seven) Days. Every Thursday   Past Week at Unknown time   • benzonatate (TESSALON) 100 MG capsule Take 100 mg by mouth 3 (Three) Times a Day As Needed for Cough.   2/5/2020 at Unknown time   • Calcium Carb-Cholecalciferol (CALCIUM-VITAMIN D) 500-200 MG-UNIT per tablet Take 1 tablet by mouth Daily.   2/5/2020 at Unknown time   • citalopram (CeleXA) 40 MG tablet Take 40 mg by mouth Daily.    2/5/2020 at Unknown time   • doxycycline (VIBRAMYCIN) 100 MG capsule Take 100 mg by mouth 2 (Two) Times a Day.   2/5/2020 at Unknown time   • predniSONE (DELTASONE) 10 MG tablet Take 10 mg by mouth Take As Directed.   2/5/2020 at Unknown time   • traZODone (DESYREL) 50 MG tablet Take 50 mg by mouth Daily.   2/5/2020 at Unknown time   • vitamin B-12 (CYANOCOBALAMIN) 500 MCG tablet Take 500 mcg by mouth Daily.   2/5/2020 at Unknown time       Scheduled Meds:    Urea 15 g Oral BID       Continuous Infusions:       PRN Meds:  •  sodium chloride    Allergies:  Patient has no known allergies.    OBJECTIVE    Vital Signs  Temp:  [98.4 °F (36.9 °C)-98.6 °F (37 °C)] 98.4 °F (36.9 °C)  Heart Rate:  [] 97  Resp:  [24-26] 24  BP: (139-183)/(75-98) 159/82    I/O this shift:  In: -   Out: 200 [Urine:200]  No intake/output data recorded.    Physical Exam:  General Appearance: alert, appears stated age and cooperative  Head: normocephalic, without obvious abnormality and atraumatic  Eyes: conjunctivae and sclerae normal and no icterus  Neck: supple and no JVD  Lungs: +SCATTERED RHONCHI AND END-EXP WHEEZES  Heart: regular rhythm & normal rate and normal S1, S2 +JATINDER  Chest Wall: no abnormalities observed  Abdomen: normal bowel sounds and soft non-tender  Extremities: moves extremities well, no edema, no cyanosis and no redness +DJD  Skin: no bleeding, bruising or rash  Neurologic: Alert, and oriented. No focal deficits    Results Review:    I reviewed the patient's new clinical results.    WBC WBC   Date Value Ref Range Status   02/06/2020 12.60 (H) 3.40 - 10.80 10*3/mm3 Final      HGB Hemoglobin   Date Value Ref Range Status   02/06/2020 14.4 12.0 - 15.9 g/dL Final      HCT Hematocrit   Date Value Ref Range Status   02/06/2020 41.4 34.0 - 46.6 % Final      Platlets No results found for: LABPLAT   MCV MCV   Date Value Ref Range Status   02/06/2020 98.9 (H) 79.0 - 97.0 fL Final          Sodium Sodium   Date Value Ref Range  Status   02/06/2020 122 (L) 136 - 145 mmol/L Final      Potassium Potassium   Date Value Ref Range Status   02/06/2020 4.1 3.5 - 5.2 mmol/L Final      Chloride Chloride   Date Value Ref Range Status   02/06/2020 85 (L) 98 - 107 mmol/L Final      CO2 CO2   Date Value Ref Range Status   02/06/2020 25.0 22.0 - 29.0 mmol/L Final      BUN BUN   Date Value Ref Range Status   02/06/2020 12 8 - 23 mg/dL Final      Creatinine Creatinine   Date Value Ref Range Status   02/06/2020 0.64 0.57 - 1.00 mg/dL Final      Calcium Calcium   Date Value Ref Range Status   02/06/2020 9.6 8.6 - 10.5 mg/dL Final      PO4 No results found for: CAPO4   Albumin Albumin   Date Value Ref Range Status   02/06/2020 4.60 3.50 - 5.20 g/dL Final      Magnesium No results found for: MG   Uric Acid Uric Acid   Date Value Ref Range Status   02/06/2020 1.7 (L) 2.4 - 5.7 mg/dL Final          Imaging Results (Last 72 Hours)     Procedure Component Value Units Date/Time    XR Chest 1 View [[id number]] Collected:  02/06/20 1201     Updated:  02/06/20 1209    Narrative:       XR CHEST 1 VW-     Date of Exam: 2/6/2020 11:22 AM     Indication: Simple Sepsis Triage Protocol.  Wheezing. Chest pain.  Shortness of breath. Intermittent cough.      Comparison Exams: None available.     Technique: Portable AP view of the chest     FINDINGS:  No consolidations or pleural effusions are observed. Chronic appearing  changes are noted. The cardiac silhouette is within normal limits for  size. The mediastinum is unremarkable. No acute osseous abnormalities  are seen.       Impression:       1.No evidence for acute cardiopulmonary process.     Electronically Signed By-Bennie Whiteside On:2/6/2020 12:02 PM  This report was finalized on [id number] by  Bennie Whiteside, .            Results for orders placed during the hospital encounter of 02/06/20   XR Chest 1 View    Narrative XR CHEST 1 VW-     Date of Exam: 2/6/2020 11:22 AM     Indication: Simple Sepsis Triage Protocol.   Wheezing. Chest pain.  Shortness of breath. Intermittent cough.      Comparison Exams: None available.     Technique: Portable AP view of the chest     FINDINGS:  No consolidations or pleural effusions are observed. Chronic appearing  changes are noted. The cardiac silhouette is within normal limits for  size. The mediastinum is unremarkable. No acute osseous abnormalities  are seen.       Impression 1.No evidence for acute cardiopulmonary process.     Electronically Signed By-Bennie Whiteside On:2/6/2020 12:02 PM  This report was finalized on [id number] by  Bennie Whiteside, .              ASSESSMENT / PLAN      RSV (acute bronchiolitis due to respiratory syncytial virus)    1. HYPONATREMIA--- Unknown baseline serum sodium. Hypotonic and clinically euvolemic. Na of 122. 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 Fluid restrict 1000 cc/day. Will give samsca, and start urea packets.  Discontinue celexa. Check few serum and urine studies. Obtain CT Chest due to history of smoking and COPD. Placed patient in fall and seizure precautions. On telemetry. Follow serial sodium levels closely. Low uric acid consistent with SIADH    2. RSV/BRONCHITIS/COUGH/ACUTE EXACERBATION OF COPD AND EMPHYSEMA---- Contact and droplet precautions. On azithromycin until cultures negative. Continue steroids and nebulizers.     3. OSTEOPOROSIS --- Alendronate along with calcium and Vitamin D supplements.     4. ANXIETY/DEPRESSION---  Continue Trazodone but discontinue Celexa as mentioned above. PRN ativan. PRN hydralazine for BP spikes.      5.  TOBACCO ABUSE--- No nicotine patch due to low sodium.     6. GENERALIZED OSTEOARTHRITIS---No NSAIDs     7. BNP NORMAL    8. OA/DJD    9. HTN-------PRN IV Hydralazine and follow        I discussed the patients findings and my recommendations with patient, family and nursing staff    Will follow along closely. Thank you for  allowing us to see this patient in renal consultation.    [facility]  [phone]  MD Chris Calvert MD  02/06/20  3:46 PM

## 2020-02-06 NOTE — ED PROVIDER NOTES
Subjective   History of Present Illness  Patient is a 69-year-old female [facility] significant for anxiety who presents with increased shortness of breath over the past week.  Patient states she did go to an urgent care 2 days ago was diagnosed with bronchitis given Tessalon Perles doxycycline and prednisone with minimal relief of her symptoms.  She does report an intermittent dry cough denies any hemoptysis.  She also denies any rhinorrhea fever nausea vomiting chest pain or abdominal pain.  Does report some intermittent diarrhea today denies any hematochezia or melena.  Denies any history of CHF or COPD.  She is not on oxygen at home  Review of Systems   Constitutional: Negative.    HENT: Negative for congestion, ear discharge, ear pain, nosebleeds, rhinorrhea, sore throat and trouble swallowing.    Respiratory: Positive for cough, shortness of breath and wheezing. Negative for chest tightness.    Cardiovascular: Negative.    Gastrointestinal: Positive for diarrhea. Negative for abdominal distention, abdominal pain, blood in stool, nausea and vomiting.   Genitourinary: Negative.    Musculoskeletal: Negative for neck pain and neck stiffness.   Skin: Negative.    Neurological: Negative.        No past medical history on file.    No Known Allergies    Past Surgical History:   Procedure Laterality Date   • HYSTERECTOMY  1988       No family history on file.    Social History     Socioeconomic History   • Marital status: Single     Spouse name: Not on file   • Number of children: Not on file   • Years of education: Not on file   • Highest education level: Not on file           Objective   Physical Exam   Constitutional: She is oriented to person, place, and time. She appears well-developed and well-nourished.  Non-toxic appearance. She does not appear ill. She appears distressed.   HENT:   Head: Normocephalic and atraumatic.   Eyes: Pupils are equal, round, and reactive to light. EOM are normal.   Neck: Normal range of  "motion. Neck supple.   Cardiovascular: Normal rate and normal heart sounds. Exam reveals no gallop and no friction rub.   No murmur heard.  Pulmonary/Chest: No accessory muscle usage. Tachypnea noted. No respiratory distress. She has decreased breath sounds. She has wheezes.   Abdominal: Soft. Bowel sounds are normal.   Musculoskeletal:        Right lower leg: She exhibits no edema.        Left lower leg: She exhibits no edema.   Lymphadenopathy:     She has no cervical adenopathy.   Neurological: She is alert and oriented to person, place, and time.   Skin: Skin is warm. Capillary refill takes less than 2 seconds. No rash noted. She is not diaphoretic. No erythema. Nails show no clubbing.   Psychiatric: She has a normal mood and affect. Her behavior is normal.   Nursing note and vitals reviewed.      Procedures           ED Course    /75   Pulse 101   Temp 98.6 °F (37 °C) (Oral)   Resp 24   Ht 154.9 cm (61\")   Wt 49.9 kg (110 lb)   SpO2 100%   BMI 20.78 kg/m²   Medications   sodium chloride 0.9 % flush 10 mL (has no administration in time range)   azithromycin (ZITHROMAX) 500 mg in 250mL NS IVPB (500 mg Intravenous New Bag 2/6/20 1330)   methylPREDNISolone sodium succinate (SOLU-Medrol) injection 125 mg (125 mg Intravenous Given 2/6/20 1101)   ipratropium-albuterol (DUO-NEB) nebulizer solution 3 mL (3 mL Nebulization Given 2/6/20 1040)   albuterol (PROVENTIL) nebulizer solution 0.083% 2.5 mg/3mL (2.5 mg Nebulization Given 2/6/20 1040)   sodium chloride 0.9 % bolus 1,000 mL (1,000 mL Intravenous New Bag 2/6/20 1340)   cefTRIAXone (ROCEPHIN) in SWFI 2 GRAMS/20ml IV PUSH syringe (2 g Intravenous Given 2/6/20 1330)   albuterol (PROVENTIL) nebulizer solution 0.083% 2.5 mg/3mL (2.5 mg Nebulization Given 2/6/20 1339)     Labs Reviewed   RESPIRATORY PANEL, PCR - Abnormal; Notable for the following components:       Result Value    RSV, PCR Detected (*)     All other components within normal limits "   COMPREHENSIVE METABOLIC PANEL - Abnormal; Notable for the following components:    Glucose 118 (*)     Sodium 122 (*)     Chloride 85 (*)     AST (SGOT) 39 (*)     All other components within normal limits    Narrative:     GFR Normal >60  Chronic Kidney Disease <60  Kidney Failure <15     CBC WITH AUTO DIFFERENTIAL - Abnormal; Notable for the following components:    WBC 12.60 (*)     MCV 98.9 (*)     MCH 34.3 (*)     Neutrophil % 85.9 (*)     Lymphocyte % 4.4 (*)     Eosinophil % 0.0 (*)     Neutrophils, Absolute 10.80 (*)     Lymphocytes, Absolute 0.60 (*)     Monocytes, Absolute 1.20 (*)     All other components within normal limits   BLOOD GAS, ARTERIAL - Abnormal; Notable for the following components:    pO2, Arterial 81.9 (*)     Base Excess, Arterial -1.2 (*)     All other components within normal limits   BNP (IN-HOUSE) - Normal    Narrative:     Among patients with dyspnea, NT-proBNP is highly sensitive for the detection of acute congestive heart failure. In addition NT-proBNP of <300 pg/ml effectively rules out acute congestive heart failure with 99% negative predictive value.    Results may be falsely decreased if patient taking Biotin.     TROPONIN (IN-HOUSE) - Normal    Narrative:     Troponin T Reference Range:  <= 0.03 ng/mL-   Negative for AMI  >0.03 ng/mL-     Abnormal for myocardial necrosis.  Clinicians would have to utilize clinical acumen, EKG, Troponin and serial changes to determine if it is an Acute Myocardial Infarction or myocardial injury due to an underlying chronic condition.       Results may be falsely decreased if patient taking Biotin.     POC LACTATE - Normal   BLOOD CULTURE   BLOOD CULTURE   BLOOD GAS, ARTERIAL   POC LACTATE   CBC AND DIFFERENTIAL    Narrative:     The following orders were created for panel order CBC & Differential.  Procedure                               Abnormality         Status                     ---------                               -----------          ------                     CBC Auto Differential[[id number]]        Abnormal            Final result                 Please view results for these tests on the individual orders.     Xr Chest 1 View    Result Date: 2/6/2020  1.No evidence for acute cardiopulmonary process.  Electronically Signed ByMaggie Whiteside On:2/6/2020 12:02 PM This report was finalized on [id number] by  Bennie Whiteside, .                                                MDM  Number of Diagnoses or Management Options  Cough:   Dyspnea, unspecified type:   Hyponatremia:   RSV (acute bronchiolitis due to respiratory syncytial virus):   Diagnosis management comments: Chart Review:  Comorbidity: Anxiety  Differentials: Pneumonia bronchitis URI viral syndrome      ;this list is not all inclusive and does not constitute the entirety of considered causes  ECG: Interpreted by myself and Dr. Lombardi sinus rhythm borderline EKG with no significant ST elevation  Labs: CMP shows glucose 118 BUN 12 creatinine 0.64 sodium 122 potassium 4.1.  [place] panel significant for RSV.  CBC shows WBC 12.6 otherwise fairly unremarkable.  No bands noted POC lactate 1.1 blood cultures pending troponin and proBNP within normal limits blood gases as described above PO2 81.9  Imaging: Was interpreted by physician and reviewed by myself:  Xr Chest 1 View  Result Date: 2/6/2020  1.No evidence for acute cardiopulmonary process.  Electronically Signed By-Bennie Whiteside On:2/6/2020 12:02 PM This report was finalized on [id number] by  Bennie Whiteside, .    Disposition/Treatment:  While in the ED IV was placed labs were obtained patient was given 2 albuterol treatments and 1 DuoNeb treatment with some improvement she was on 2L nasal cannula lab work was significant for sodium 122 otherwise fairly unremarkable white count was 12.6.  proBNP within normal limits.  Respiratory panel significant for RSV POC lactate was 1.1.  Chest x-ray showed no acute cardiopulmonary ab normalities  results and findings were discussed with the patient and family bedside voiced understanding admission.  Patient will be admitted to hospitalist Christine GARCIA for Dr. Willett who is in agreement for admission.       Amount and/or Complexity of Data Reviewed  Clinical lab tests: reviewed  Tests in the radiology section of CPT®: reviewed  Tests in the medicine section of CPT®: reviewed        Final diagnoses:   RSV (acute bronchiolitis due to respiratory syncytial virus)   Dyspnea, unspecified type   Hyponatremia   Cough            Homer Bernardo PA  02/06/20 6734

## 2020-02-07 PROBLEM — Z91.89: Status: ACTIVE | Noted: 2020-02-07

## 2020-02-07 PROBLEM — J44.1 ACUTE EXACERBATION OF CHRONIC OBSTRUCTIVE PULMONARY DISEASE (COPD) (HCC): Chronic | Status: ACTIVE | Noted: 2020-02-07

## 2020-02-07 LAB
ALBUMIN SERPL-MCNC: 3.9 G/DL (ref 3.5–5.2)
ALBUMIN/GLOB SERPL: 1.5 G/DL
ALP SERPL-CCNC: 62 U/L (ref 39–117)
ALT SERPL W P-5'-P-CCNC: 17 U/L (ref 1–33)
ANION GAP SERPL CALCULATED.3IONS-SCNC: 12 MMOL/L (ref 5–15)
ANION GAP SERPL CALCULATED.3IONS-SCNC: 12 MMOL/L (ref 5–15)
ANION GAP SERPL CALCULATED.3IONS-SCNC: 9 MMOL/L (ref 5–15)
AST SERPL-CCNC: 41 U/L (ref 1–32)
BASOPHILS # BLD AUTO: 0 10*3/MM3 (ref 0–0.2)
BASOPHILS NFR BLD AUTO: 0.2 % (ref 0–1.5)
BILIRUB SERPL-MCNC: 0.2 MG/DL (ref 0.2–1.2)
BUN BLD-MCNC: 12 MG/DL (ref 8–23)
BUN BLD-MCNC: 13 MG/DL (ref 8–23)
BUN BLD-MCNC: 15 MG/DL (ref 8–23)
BUN/CREAT SERPL: 19 (ref 7–25)
BUN/CREAT SERPL: 20.6 (ref 7–25)
BUN/CREAT SERPL: 22.1 (ref 7–25)
CA-I SERPL ISE-MCNC: 1.19 MMOL/L (ref 1.2–1.3)
CALCIUM SPEC-SCNC: 9.1 MG/DL (ref 8.6–10.5)
CALCIUM SPEC-SCNC: 9.2 MG/DL (ref 8.6–10.5)
CALCIUM SPEC-SCNC: 9.4 MG/DL (ref 8.6–10.5)
CHLORIDE SERPL-SCNC: 96 MMOL/L (ref 98–107)
CHLORIDE SERPL-SCNC: 97 MMOL/L (ref 98–107)
CHLORIDE SERPL-SCNC: 99 MMOL/L (ref 98–107)
CO2 SERPL-SCNC: 24 MMOL/L (ref 22–29)
CO2 SERPL-SCNC: 25 MMOL/L (ref 22–29)
CO2 SERPL-SCNC: 27 MMOL/L (ref 22–29)
CREAT BLD-MCNC: 0.63 MG/DL (ref 0.57–1)
CREAT BLD-MCNC: 0.63 MG/DL (ref 0.57–1)
CREAT BLD-MCNC: 0.68 MG/DL (ref 0.57–1)
DEPRECATED RDW RBC AUTO: 44.2 FL (ref 37–54)
EOSINOPHIL # BLD AUTO: 0 10*3/MM3 (ref 0–0.4)
EOSINOPHIL NFR BLD AUTO: 0 % (ref 0.3–6.2)
ERYTHROCYTE [DISTWIDTH] IN BLOOD BY AUTOMATED COUNT: 13 % (ref 12.3–15.4)
GFR SERPL CREATININE-BSD FRML MDRD: 85 ML/MIN/1.73
GFR SERPL CREATININE-BSD FRML MDRD: 93 ML/MIN/1.73
GFR SERPL CREATININE-BSD FRML MDRD: 93 ML/MIN/1.73
GLOBULIN UR ELPH-MCNC: 2.6 GM/DL
GLUCOSE BLD-MCNC: 123 MG/DL (ref 65–99)
GLUCOSE BLD-MCNC: 128 MG/DL (ref 65–99)
GLUCOSE BLD-MCNC: 135 MG/DL (ref 65–99)
HCT VFR BLD AUTO: 37.2 % (ref 34–46.6)
HGB BLD-MCNC: 12.7 G/DL (ref 12–15.9)
LYMPHOCYTES # BLD AUTO: 0.7 10*3/MM3 (ref 0.7–3.1)
LYMPHOCYTES NFR BLD AUTO: 6.7 % (ref 19.6–45.3)
MAGNESIUM SERPL-MCNC: 2 MG/DL (ref 1.6–2.4)
MCH RBC QN AUTO: 33.5 PG (ref 26.6–33)
MCHC RBC AUTO-ENTMCNC: 34.2 G/DL (ref 31.5–35.7)
MCV RBC AUTO: 98 FL (ref 79–97)
MONOCYTES # BLD AUTO: 1.4 10*3/MM3 (ref 0.1–0.9)
MONOCYTES NFR BLD AUTO: 13.8 % (ref 5–12)
NEUTROPHILS # BLD AUTO: 8 10*3/MM3 (ref 1.7–7)
NEUTROPHILS NFR BLD AUTO: 79.3 % (ref 42.7–76)
NRBC BLD AUTO-RTO: 0 /100 WBC (ref 0–0.2)
NT-PROBNP SERPL-MCNC: 1286 PG/ML (ref 5–900)
PHOSPHATE SERPL-MCNC: 3.3 MG/DL (ref 2.5–4.5)
PLATELET # BLD AUTO: 282 10*3/MM3 (ref 140–450)
PMV BLD AUTO: 6.7 FL (ref 6–12)
POTASSIUM BLD-SCNC: 4.2 MMOL/L (ref 3.5–5.2)
POTASSIUM BLD-SCNC: 4.4 MMOL/L (ref 3.5–5.2)
POTASSIUM BLD-SCNC: 4.5 MMOL/L (ref 3.5–5.2)
PROT SERPL-MCNC: 6.5 G/DL (ref 6–8.5)
RBC # BLD AUTO: 3.79 10*6/MM3 (ref 3.77–5.28)
SODIUM BLD-SCNC: 133 MMOL/L (ref 136–145)
SODIUM BLD-SCNC: 133 MMOL/L (ref 136–145)
SODIUM BLD-SCNC: 135 MMOL/L (ref 136–145)
TROPONIN T SERPL-MCNC: <0.01 NG/ML (ref 0–0.03)
TROPONIN T SERPL-MCNC: <0.01 NG/ML (ref 0–0.03)
WBC NRBC COR # BLD: 10.1 10*3/MM3 (ref 3.4–10.8)

## 2020-02-07 PROCEDURE — 83880 ASSAY OF NATRIURETIC PEPTIDE: CPT | Performed by: PHYSICIAN ASSISTANT

## 2020-02-07 PROCEDURE — 25010000002 ONDANSETRON PER 1 MG: Performed by: PHYSICIAN ASSISTANT

## 2020-02-07 PROCEDURE — 25010000002 HEPARIN (PORCINE) PER 1000 UNITS: Performed by: INTERNAL MEDICINE

## 2020-02-07 PROCEDURE — 82330 ASSAY OF CALCIUM: CPT | Performed by: INTERNAL MEDICINE

## 2020-02-07 PROCEDURE — 85025 COMPLETE CBC W/AUTO DIFF WBC: CPT | Performed by: PHYSICIAN ASSISTANT

## 2020-02-07 PROCEDURE — 94799 UNLISTED PULMONARY SVC/PX: CPT

## 2020-02-07 PROCEDURE — 63710000001 PREDNISONE PER 1 MG: Performed by: PHYSICIAN ASSISTANT

## 2020-02-07 PROCEDURE — 84484 ASSAY OF TROPONIN QUANT: CPT | Performed by: PHYSICIAN ASSISTANT

## 2020-02-07 PROCEDURE — 99232 SBSQ HOSP IP/OBS MODERATE 35: CPT | Performed by: INTERNAL MEDICINE

## 2020-02-07 PROCEDURE — 84100 ASSAY OF PHOSPHORUS: CPT | Performed by: INTERNAL MEDICINE

## 2020-02-07 PROCEDURE — 83735 ASSAY OF MAGNESIUM: CPT | Performed by: INTERNAL MEDICINE

## 2020-02-07 PROCEDURE — 80053 COMPREHEN METABOLIC PANEL: CPT | Performed by: INTERNAL MEDICINE

## 2020-02-07 RX ADMIN — IPRATROPIUM BROMIDE AND ALBUTEROL SULFATE 3 ML: .5; 3 SOLUTION RESPIRATORY (INHALATION) at 11:11

## 2020-02-07 RX ADMIN — BENZONATATE 200 MG: 100 CAPSULE ORAL at 08:00

## 2020-02-07 RX ADMIN — BENZONATATE 200 MG: 100 CAPSULE ORAL at 21:38

## 2020-02-07 RX ADMIN — ONDANSETRON 4 MG: 2 INJECTION INTRAMUSCULAR; INTRAVENOUS at 11:08

## 2020-02-07 RX ADMIN — ACETAMINOPHEN 650 MG: 325 TABLET, FILM COATED ORAL at 09:43

## 2020-02-07 RX ADMIN — HEPARIN SODIUM 5000 UNITS: 5000 INJECTION INTRAVENOUS; SUBCUTANEOUS at 08:00

## 2020-02-07 RX ADMIN — BENZONATATE 200 MG: 100 CAPSULE ORAL at 13:00

## 2020-02-07 RX ADMIN — IPRATROPIUM BROMIDE AND ALBUTEROL SULFATE 3 ML: .5; 3 SOLUTION RESPIRATORY (INHALATION) at 16:06

## 2020-02-07 RX ADMIN — BENZONATATE 200 MG: 100 CAPSULE ORAL at 17:34

## 2020-02-07 RX ADMIN — PREDNISONE 40 MG: 20 TABLET ORAL at 08:00

## 2020-02-07 RX ADMIN — OYSTER SHELL CALCIUM WITH VITAMIN D 1 TABLET: 500; 200 TABLET, FILM COATED ORAL at 09:44

## 2020-02-07 RX ADMIN — Medication 10 ML: at 09:44

## 2020-02-07 RX ADMIN — MELATONIN TAB 5 MG 5 MG: 5 TAB at 21:41

## 2020-02-07 RX ADMIN — Medication 10 ML: at 21:38

## 2020-02-07 RX ADMIN — CYANOCOBALAMIN TAB 250 MCG 500 MCG: 250 TAB at 09:43

## 2020-02-07 NOTE — PROGRESS NOTES
[facility] Medicine Services Daily Progress Note      Hospitalist Team  LOS 1 days      Patient Care Team:  Nikki Rosado as PCP - General  Ronda Corbin MD as Consulting Physician (Nephrology)    Patient Location: [facility]/1      Subjective   Subjective     Chief Complaint / Subjective  Chief Complaint   Patient presents with   • Shortness of Breath         Brief Synopsis of Hospital Course/HPI  Ms. Bates is a 69 y.o. with a past medical history of anxiety/depression and HLD who presents to [facility] 2/6 complaining of dyspnea and weakness x 1 week, worsening over the last two days.  She states she was seen at urgent care 2 days ago and diagnosed with bronchitis.  She reports a dry cough, but denies known fever or chills. She denies known sick contacts.  She denies home oxygen use.       In the ED, the patient's labs included the following: Na 122, K 4.1, BUN 12, Cr 0.64, glucose 118, WBC 12.6, hgb 14.4, plts 307.  Troponin negative.  .  Lactate negative.  CXR: negative.  EKG: SR, HR 90.  Respiratory panel positive for RSV.  She was given breathing treatments, a dose of rocephin/zithromax and 1 L NS bolus in the ED.  She was admitted for further evaluation and management.   Feeling a little better. 02/07/20  7:00 PM      ROS  Constitution: Negative for chills and fever.   Cardiovascular: Negative for chest pain.   Respiratory: Positive for shortness of breath.    Gastrointestinal: Negative for nausea and vomiting.   Neurological: Positive for weakness. Negative for dizziness.   All other systems reviewed and are negative.         Objective   Objective      Vital Signs  Temp:  [97.4 °F (36.3 °C)-99.5 °F (37.5 °C)] 98 °F (36.7 °C)  Heart Rate:  [] 77  Resp:  [20-26] 20  BP: (130-150)/(72-86) 130/72  Oxygen Therapy  SpO2: 98 %  Pulse Oximetry Type: Intermittent  Device (Oxygen Therapy): nasal cannula  Flow (L/min): 2  Oxygen Concentration (%):  "28  Flowsheet Rows      First Filed Value   Admission Height  154.9 cm (61\") Documented at 02/06/2020 1019   Admission Weight  49.9 kg (110 lb) Documented at 02/06/2020 1019        Intake & Output (last 3 days)       02/04 0701 - 02/05 0700 02/05 0701 - 02/06 0700 02/06 0701 - 02/07 0700 02/07 0701 - 02/08 0700    P.O.    240    Total Intake(mL/kg)    240 (4.4)    Urine (mL/kg/hr)   2370 800 (1.5)    Total Output   2370 800    Net   -2370 -560                Lines, Drains & Airways    Active LDAs     Name:   Placement date:   Placement time:   Site:   Days:    Peripheral IV 02/06/20 1047 Right Antecubital   02/06/20    1047    Antecubital   1                  Physical Exam:    Physical Exam  Physical Exam   Constitutional: Patient appears well-developed and well-nourished and in no acute distress     HEENT:   Head: Normocephalic and atraumatic.   Eyes:  Pupils are equal, round, and reactive to light. EOM are intact. Sclera are anicteric and non-injected.  Mouth and Throat: Patient has moist mucous membranes. Oropharynx is clear of any erythema or exudate.       Neck: Neck supple.  No thyromegaly present. No lymphadenopathy present. No  masses.     Cardiovascular: Inspection: No JVD present. Palpation: No parasternal heave. Pedal pulses +1 bilaterally. No leg edema. Auscultation: Regular rate, regular rhythm, S1 normal and S2 normal. reveals no gallop and no friction rub. No Carotid bruit bilaterally.    Pulmonary/Chest: Inspection: No distress, no use of accessory muscles.  Extensive expiratory rhonchi bilaterally. No respiratory distress. No wheezes. No rales.  Air entry better than yesterday.    Abdomen /Gastrointestinal: Inspection: no distension. Palpation: no masses, no organomegaly. Soft. There is no tenderness. Bowel sounds are normal.     Musculoskeletal: Normal Muscle tone. Age appropriate, no deformities.    Neurological: Patient is alert and oriented to person, place, and time. Cranial nerves II-XII are " grossly intact with no focal deficits. Sensori-motor exam is normal. No cerebellar signs.    Skin: Skin is warm. No rash noted. Nails show no clubbing.  No cyanosis or erythema. No bruising.    Emotional Behavior:   Appropriate   Debilities:  Age appropriate          Procedures:              Results Review:     I reviewed the patient's new clinical results.      Lab Results (last 24 hours)     Procedure Component Value Units Date/Time    Basic Metabolic Panel [[id number]]  (Abnormal) Collected:  02/07/20 1150    Specimen:  Blood Updated:  02/07/20 1421     Glucose 128 mg/dL      BUN 15 mg/dL      Creatinine 0.68 mg/dL      Sodium 135 mmol/L      Potassium 4.2 mmol/L      Chloride 96 mmol/L      CO2 27.0 mmol/L      Calcium 9.2 mg/dL      eGFR Non African Amer 85 mL/min/1.73      BUN/Creatinine Ratio 22.1     Anion Gap 12.0 mmol/L     Narrative:       GFR Normal >60  Chronic Kidney Disease <60  Kidney Failure <15      Blood Culture - Blood, Arm, Left [[id number]] Collected:  02/06/20 1105    Specimen:  Blood from Arm, Left Updated:  02/07/20 1116     Blood Culture No growth at 24 hours    Blood Culture - Blood, Arm, Right [[id number]] Collected:  02/06/20 1046    Specimen:  Blood from Arm, Right Updated:  02/07/20 1116     Blood Culture No growth at 24 hours    Comprehensive Metabolic Panel [[id number]]  (Abnormal) Collected:  02/07/20 0437    Specimen:  Blood Updated:  02/07/20 0533     Glucose 123 mg/dL      BUN 13 mg/dL      Creatinine 0.63 mg/dL      Sodium 133 mmol/L      Potassium 4.4 mmol/L      Chloride 99 mmol/L      CO2 25.0 mmol/L      Calcium 9.1 mg/dL      Total Protein 6.5 g/dL      Albumin 3.90 g/dL      ALT (SGPT) 17 U/L      AST (SGOT) 41 U/L      Alkaline Phosphatase 62 U/L      Total Bilirubin 0.2 mg/dL      eGFR Non African Amer 93 mL/min/1.73      Globulin 2.6 gm/dL      A/G Ratio 1.5 g/dL      BUN/Creatinine Ratio 20.6     Anion Gap 9.0 mmol/L     Narrative:       GFR Normal >60  Chronic Kidney  Disease <60  Kidney Failure <15      Magnesium [[id number]]  (Normal) Collected:  02/07/20 0437    Specimen:  Blood Updated:  02/07/20 0533     Magnesium 2.0 mg/dL     Phosphorus [[id number]]  (Normal) Collected:  02/07/20 0437    Specimen:  Blood Updated:  02/07/20 0533     Phosphorus 3.3 mg/dL     Troponin [[id number]]  (Normal) Collected:  02/07/20 0437    Specimen:  Blood Updated:  02/07/20 0530     Troponin T <0.010 ng/mL     Narrative:       Troponin T Reference Range:  <= 0.03 ng/mL-   Negative for AMI  >0.03 ng/mL-     Abnormal for myocardial necrosis.  Clinicians would have to utilize clinical acumen, EKG, Troponin and serial changes to determine if it is an Acute Myocardial Infarction or myocardial injury due to an underlying chronic condition.       Results may be falsely decreased if patient taking Biotin.      BNP [[id number]]  (Abnormal) Collected:  02/07/20 0437    Specimen:  Blood Updated:  02/07/20 0529     proBNP 1,286.0 pg/mL     Narrative:       Among patients with dyspnea, NT-proBNP is highly sensitive for the detection of acute congestive heart failure. In addition NT-proBNP of <300 pg/ml effectively rules out acute congestive heart failure with 99% negative predictive value.    Results may be falsely decreased if patient taking Biotin.      Calcium, Ionized [[id number]]  (Abnormal) Collected:  02/07/20 0437    Specimen:  Blood Updated:  02/07/20 0525     Ionized Calcium 1.19 mmol/L     CBC & Differential [[id number]] Collected:  02/07/20 0437    Specimen:  Blood Updated:  02/07/20 0510    Narrative:       The following orders were created for panel order CBC & Differential.  Procedure                               Abnormality         Status                     ---------                               -----------         ------                     CBC Auto Differential[[id number]]        Abnormal            Final result                 Please view results for these tests on the individual orders.     CBC Auto Differential [[id number]]  (Abnormal) Collected:  02/07/20 0437    Specimen:  Blood Updated:  02/07/20 0510     WBC 10.10 10*3/mm3      RBC 3.79 10*6/mm3      Hemoglobin 12.7 g/dL      Hematocrit 37.2 %      MCV 98.0 fL      MCH 33.5 pg      MCHC 34.2 g/dL      RDW 13.0 %      RDW-SD 44.2 fl      MPV 6.7 fL      Platelets 282 10*3/mm3      Neutrophil % 79.3 %      Lymphocyte % 6.7 %      Monocyte % 13.8 %      Eosinophil % 0.0 %      Basophil % 0.2 %      Neutrophils, Absolute 8.00 10*3/mm3      Lymphocytes, Absolute 0.70 10*3/mm3      Monocytes, Absolute 1.40 10*3/mm3      Eosinophils, Absolute 0.00 10*3/mm3      Basophils, Absolute 0.00 10*3/mm3      nRBC 0.0 /100 WBC     Troponin [[id number]]  (Normal) Collected:  02/06/20 2333    Specimen:  Blood Updated:  02/07/20 0136     Troponin T <0.010 ng/mL     Narrative:       Troponin T Reference Range:  <= 0.03 ng/mL-   Negative for AMI  >0.03 ng/mL-     Abnormal for myocardial necrosis.  Clinicians would have to utilize clinical acumen, EKG, Troponin and serial changes to determine if it is an Acute Myocardial Infarction or myocardial injury due to an underlying chronic condition.       Results may be falsely decreased if patient taking Biotin.      Basic Metabolic Panel [[id number]]  (Abnormal) Collected:  02/06/20 2333    Specimen:  Blood Updated:  02/07/20 0132     Glucose 135 mg/dL      BUN 12 mg/dL      Creatinine 0.63 mg/dL      Sodium 133 mmol/L      Potassium 4.5 mmol/L      Chloride 97 mmol/L      CO2 24.0 mmol/L      Calcium 9.4 mg/dL      eGFR Non African Amer 93 mL/min/1.73      BUN/Creatinine Ratio 19.0     Anion Gap 12.0 mmol/L     Narrative:       GFR Normal >60  Chronic Kidney Disease <60  Kidney Failure <15      TSH [[id number]]  (Normal) Collected:  02/06/20 1917    Specimen:  Blood Updated:  02/06/20 2040     TSH 0.594 uIU/mL     Cortisol [[id number]] Collected:  02/06/20 1917    Specimen:  Blood Updated:  02/06/20 2040     Cortisol 12.03 mcg/dL  "    Narrative:       Cortisol Reference Ranges:    Cortisol 6AM - 10AM Range: 6.02-18.40 mcg/dl  Cortisol 4PM - 8PM Range: 2.68-10.50 mcg/dl      Results may be falsely increased if patient taking Biotin.      Procalcitonin [[id number]]  (Abnormal) Collected:  02/06/20 1917    Specimen:  Blood Updated:  02/06/20 2040     Procalcitonin 0.09 ng/mL     Narrative:       As a Marker for Sepsis (Non-Neonates):   1. <0.5 ng/mL represents a low risk of severe sepsis and/or septic shock.  1. >2 ng/mL represents a high risk of severe sepsis and/or septic shock.    As a Marker for Lower Respiratory Tract Infections that require antibiotic therapy:  PCT on Admission     Antibiotic Therapy             6-12 Hrs later  > 0.5                Strongly Recommended            >0.25 - <0.5         Recommended  0.1 - 0.25           Discouraged                   Remeasure/reassess PCT  <0.1                 Strongly Discouraged          Remeasure/reassess PCT      As 28 day mortality risk marker: \"Change in Procalcitonin Result\" (> 80 % or <=80 %) if Day 0 (or Day 1) and Day 4 values are available. Refer to [email or web address]   Change in PCT <=80 %   A decrease of PCT levels below or equal to 80 % defines a positive change in PCT test result representing a higher risk for 28-day all-cause mortality of patients diagnosed with severe sepsis or septic shock.  Change in PCT > 80 %   A decrease of PCT levels of more than 80 % defines a negative change in PCT result representing a lower risk for 28-day all-cause mortality of patients diagnosed with severe sepsis or septic shock.                Results may be falsely decreased if patient taking Biotin.     Basic Metabolic Panel [[id number]]  (Abnormal) Collected:  02/06/20 1917    Specimen:  Blood Updated:  02/06/20 2039     Glucose 171 mg/dL      BUN 12 mg/dL      Creatinine 0.58 mg/dL      Sodium 127 mmol/L      Potassium 4.2 mmol/L      Chloride 91 mmol/L      CO2 23.0 mmol/L  "     Calcium 8.8 mg/dL      eGFR Non African Amer 102 mL/min/1.73      BUN/Creatinine Ratio 20.7     Anion Gap 13.0 mmol/L     Narrative:       GFR Normal >60  Chronic Kidney Disease <60  Kidney Failure <15      C-reactive Protein [[id number]]  (Abnormal) Collected:  02/06/20 1917    Specimen:  Blood Updated:  02/06/20 2039     C-Reactive Protein 2.82 mg/dL     Troponin [[id number]]  (Normal) Collected:  02/06/20 1917    Specimen:  Blood Updated:  02/06/20 2023     Troponin T <0.010 ng/mL     Narrative:       Troponin T Reference Range:  <= 0.03 ng/mL-   Negative for AMI  >0.03 ng/mL-     Abnormal for myocardial necrosis.  Clinicians would have to utilize clinical acumen, EKG, Troponin and serial changes to determine if it is an Acute Myocardial Infarction or myocardial injury due to an underlying chronic condition.       Results may be falsely decreased if patient taking Biotin.      Protein Elec + Interp, Serum [[id number]] Collected:  02/06/20 1917    Specimen:  Blood Updated:  02/06/20 2000    Legionella Antigen, Urine - Urine, Urine, Clean Catch [[id number]]  (Normal) Collected:  02/06/20 1523    Specimen:  Urine, Clean Catch Updated:  02/06/20 1723     LEGIONELLA ANTIGEN, URINE Negative    Sodium, Urine, Random - Urine, Clean Catch [[id number]] Collected:  02/06/20 1523    Specimen:  Urine, Clean Catch Updated:  02/06/20 1713     Sodium, Urine 28 mmol/L     Narrative:       Reference intervals for random urine have not been established.  Clinical usage is dependent upon physician's interpretation in combination with other laboratory tests.       Osmolality, Urine - Urine, Clean Catch [[id number]]  (Normal) Collected:  02/06/20 1523    Specimen:  Urine, Clean Catch Updated:  02/06/20 1706     Osmolality, Urine 314 mOsm/kg     Urinalysis, Microscopic Only - Urine, Clean Catch [[id number]]  (Abnormal) Collected:  02/06/20 1523    Specimen:  Urine, Clean Catch Updated:  02/06/20 1705     RBC, UA 3-5 /HPF      WBC, UA 0-2  /HPF      Bacteria, UA None Seen /HPF      Squamous Epithelial Cells, UA 3-6 /HPF      Hyaline Casts, UA 0-2 /LPF      Methodology Automated Microscopy    Urinalysis With Culture If Indicated - Urine, Clean Catch [[id number]]  (Abnormal) Collected:  02/06/20 1523    Specimen:  Urine, Clean Catch Updated:  02/06/20 1705     Color, UA Yellow     Appearance, UA Clear     pH, UA 6.5     Specific Gravity, UA 1.011     Glucose, UA Negative     Ketones, UA Trace     Bilirubin, UA Negative     Blood, UA Small (1+)     Protein, UA Trace     Leuk Esterase, UA Negative     Nitrite, UA Negative     Urobilinogen, UA 0.2 E.U./dL        No results found for: HGBA1C        Results from last 7 days   Lab Units 02/06/20  1113   PH, ARTERIAL pH units 7.397   PO2 ART mm Hg 81.9*   PCO2, ARTERIAL mm Hg 38.1   HCO3 ART mmol/L 23.4     No results found for: LIPASE  No results found for: CHOL, CHLPL, TRIG, HDL, LDL, LDLDIRECT    No results found for: INTRAOP, PREDX, FINALDX, COMDX    Microbiology Results (last 10 days)     Procedure Component Value - Date/Time    Legionella Antigen, Urine - Urine, Urine, Clean Catch [[id number]]  (Normal) Collected:  02/06/20 1523    Lab Status:  Final result Specimen:  Urine, Clean Catch Updated:  02/06/20 1723     LEGIONELLA ANTIGEN, URINE Negative    Blood Culture - Blood, Arm, Left [[id number]] Collected:  02/06/20 1105    Lab Status:  Preliminary result Specimen:  Blood from Arm, Left Updated:  02/07/20 1116     Blood Culture No growth at 24 hours    Respiratory Panel, PCR - Swab, Nasopharynx [[id number]]  (Abnormal) Collected:  02/06/20 1053    Lab Status:  Final result Specimen:  Swab from Nasopharynx Updated:  02/06/20 1240     ADENOVIRUS, PCR Not Detected     Coronavirus 229E Not Detected     Coronavirus HKU1 Not Detected     Coronavirus NL63 Not Detected     Coronavirus OC43 Not Detected     Human Metapneumovirus Not Detected     Human Rhinovirus/Enterovirus Not Detected     Influenza B PCR Not  Detected     Parainfluenza Virus 1 Not Detected     Parainfluenza Virus 2 Not Detected     Parainfluenza Virus 3 Not Detected     Parainfluenza Virus 4 Not Detected     Bordetella pertussis pcr Not Detected     Influenza A H1 2009 PCR Not Detected     Chlamydophila pneumoniae PCR Not Detected     Mycoplasma pneumo by PCR Not Detected     Influenza A PCR Not Detected     Influenza A H3 Not Detected     Influenza A H1 Not Detected     RSV, PCR Detected    Blood Culture - Blood, Arm, Right [[id number]] Collected:  02/06/20 1046    Lab Status:  Preliminary result Specimen:  Blood from Arm, Right Updated:  02/07/20 1116     Blood Culture No growth at 24 hours          ECG/EMG Results (most recent)     Procedure Component Value Units Date/Time    ECG 12 Lead [[id number]] Collected:  02/06/20 1101     Updated:  02/07/20 0703    Narrative:       HEART RATE= 90  bpm  RR Interval= 668  ms  CT Interval= 154  ms  P Horizontal Axis= 13  deg  P Front Axis= 37  deg  QRSD Interval= 80  ms  QT Interval= 343  ms  QRS Axis= 52  deg  T Wave Axis= 57  deg  - BORDERLINE ECG -  Sinus rhythm  Consider anterior infarct  No previous ECG available for comparison  Electronically Signed By: Pavan Lombardi (Christoph) 07-Feb-2020 07:03:44  Date and Time of Study: 2020-02-06 11:01:22                    Ct Chest Without Contrast    Result Date: 2/6/2020  1. Bilateral lower lobe bronchial wall thickening with secretions within the bronchioles. This can be seen with bronchitis or aspiration. 2. Tree in bud nodularity within the left lower lobe likely representing infectious or inflammatory bronchiolitis. 3. Centrilobular emphysema with an upper lobe predominance.    Electronically Signed By-Michael Nicholson On:2/6/2020 11:39 PM This report was finalized on [id number] by  Michael Nicholson, .    Xr Chest 1 View    Result Date: 2/6/2020  1.No evidence for acute cardiopulmonary process.  Electronically Signed By-Bennie Whiteside On:2/6/2020 12:02 PM This report  was finalized on [id number] by  Bennie Whiteside, .          Xrays, labs reviewed personally by physician.    Medication Review:   I have reviewed the patient's current medication list      Scheduled Meds    alendronate 70 mg Oral Weekly   benzonatate 200 mg Oral 4x Daily   calcium-vitamin D 1 tablet Oral Daily   cefTRIAXone 1 g Intravenous Q24H   heparin (porcine) 5,000 Units Subcutaneous Q12H   predniSONE 40 mg Oral Daily With Breakfast   sodium chloride 10 mL Intravenous Q12H   vitamin B-12 500 mcg Oral Daily       Meds Infusions       Meds PRN  •  acetaminophen **OR** acetaminophen **OR** acetaminophen  •  bisacodyl  •  calcium carbonate  •  docusate sodium  •  guaiFENesin-codeine  •  hydrALAZINE  •  ipratropium-albuterol  •  LORazepam  •  magnesium hydroxide  •  melatonin  •  ondansetron **OR** ondansetron  •  sodium chloride  •  sodium chloride    I personally reviewed patient's x-ray films and my findings are 0    I personally reviewed patient's EKG strips and my findings are 0    Assessment/Plan   Assessment/Plan     Active Hospital Problems    Diagnosis  POA   • **RSV (acute bronchiolitis due to respiratory syncytial virus) [J21.0]  Yes     Priority: High   • Acute exacerbation of chronic obstructive pulmonary disease (COPD) (CMS/[facility]) [J44.1]  Yes     Priority: Medium   • SIADH (syndrome of inappropriate ADH production) (CMS/[facility]) [E22.2]  Yes     Priority: Medium   • At risk for secondary infection [Z91.89]  Yes      Resolved Hospital Problems   No resolved problems to display.       MEDICAL DECISION MAKING COMPLEXITY BY PROBLEM:   Discussed with patient and other family members at bedside.  Improving satisfactorily  Sodium satisfactory  Responding to respiratory regimen.      VTE Prophylaxis -   Mechanical Order History:      Ordered        02/06/20 1559  Place Sequential Compression Device  Once         02/06/20 1559  Maintain Sequential Compression Device  Continuous                 Pharmalogical Order  History:     Ordered     Dose Route Frequency Stop    02/06/20 1555  heparin (porcine) 5000 UNIT/ML injection 5,000 Units      5,000 Units SC Every 12 Hours Scheduled --            Code Status -   Code Status and Medical Interventions:   Ordered at: 02/06/20 1538     Code Status:    CPR     Medical Interventions (Level of Support Prior to Arrest):    Full       Discharge Planning          Destination      Coordination has not been started for this encounter.      Durable Medical Equipment      Coordination has not been started for this encounter.      Dialysis/Infusion      Coordination has not been started for this encounter.      Home Medical Care      Coordination has not been started for this encounter.      Therapy      Coordination has not been started for this encounter.      Community Resources      Coordination has not been started for this encounter.      EM 20 minutes at bedside counseling 02/07/20  7:05 PM        Electronically signed by Rafal Willett MD, 02/07/20, 4:35 PM.  [facility], [place], operated by [facility] Hospitalist Team

## 2020-02-07 NOTE — PROGRESS NOTES
Discharge Planning Assessment   [facility]     Patient Name: Akila Bates  MRN: [id number]  Today's Date: 2/7/2020    Admit Date: 2/6/2020    Discharge Needs Assessment     Row Name 02/07/20 1255       Living Environment    Lives With  sibling(s)    Current Living Arrangements  home/apartment/condo    Primary Care Provided by  self    Provides Primary Care For  no one    Family Caregiver if Needed  sibling(s)    Able to Return to Prior Arrangements  yes       Resource/Environmental Concerns    Resource/Environmental Concerns  none    Transportation Concerns  car, none       Transition Planning    Patient/Family Anticipates Transition to  home    Patient/Family Anticipated Services at Transition  none    Transportation Anticipated  car, drives self       Discharge Needs Assessment    Readmission Within the Last 30 Days  no previous admission in last 30 days    Concerns to be Addressed  no discharge needs identified;denies needs/concerns at this time    Equipment Currently Used at Home  none    Anticipated Changes Related to Illness  none    Equipment Needed After Discharge  none        Discharge Plan     Row Name 02/07/20 7461       Plan    Plan  Anticipate routine home. Watch for potential need for 6 minute walk 24-48 hours prior to discharge.     Patient/Family in Agreement with Plan  yes    Plan Comments  Met with patient at bedside who reports no anticipated needs at discharge. Patient reports a high level of independence, drives and is able to afford her medications. Barrier: IV antibiotics.         Expected Discharge Date and Time     Expected Discharge Date Expected Discharge Time    Feb 9, 2020         Demographic Summary     Row Name 02/07/20 7608       General Information    Admission Type  inpatient    Arrived From  home    Referral Source  admission list    Reason for Consult  discharge planning        Functional Status     Row Name 02/07/20 1335       Functional Status    Usual Activity Tolerance  good     Current Activity Tolerance  good       Functional Status, IADL    Medications  independent    Meal Preparation  independent    Housekeeping  independent    Laundry  independent    Shopping  independent       Mental Status    General Appearance WDL  WDL       Mental Status Summary    Recent Changes in Mental Status/Cognitive Functioning  no changes        Aisha Ramirez  [phone]

## 2020-02-07 NOTE — PLAN OF CARE
Problem: Patient Care Overview  Goal: Plan of Care Review  Outcome: Ongoing (interventions implemented as appropriate)  Flowsheets (Taken 2/7/2020 8514)  Plan of Care Reviewed With: patient  Outcome Summary: pt rested well through the night. Pt gets sana SOB after getting up to BSC.      [facility] [address] [place] [address] 
[phone] Patient: Stephan Garcia Bridegni MRN: [id number] :2019 You are allergic to the following No active allergies Immunizations Administered for This Admission Name Date Hep B, Adol/Ped 2017 Recent Documentation Height Weight BMI  
  
  
 0.495 m (43 %, Z= -0.19)* 2.515 kg (2 %, Z= -2.02)* 10.25 kg/m2 *Growth percentiles are based on WHO (Boys, 0-2 years) data. Emergency Contacts Name Discharge Info Relation Home Work Mobile Parent [1] About your child's hospitalization Your child was admitted on:  May 26, 2017 [facility] Your child was discharged on:  May 28, 2017 Unit phone number:  [phone] Why your child was hospitalized Your child's primary diagnosis was:  Not on File Your child's diagnoses also included:  Single Liveborn, Born In Hospital, Delivered By Vaginal Delivery Providers Seen During Your Hospitalizations Provider Role Specialty Primary office phone Tiana Lizarraga MD Attending Provider Pediatrics [phone] Your Primary Care Physician (PCP) ** None ** Follow-up Information Follow up With Details Comments Contact Info Tiana Lizarraga MD Schedule an appointment as soon as possible for a visit in 2 days  [address] 
[phone] Current Discharge Medication List  
  
Notice You have not been prescribed any medications. Discharge Instructions  DISCHARGE INSTRUCTIONS Name: Paty Vincent YOB: 2019 Primary Diagnosis: Active Problems: 
  Single liveborn, born in hospital, delivered by vaginal delivery (2019) General:  
 
Cord Care:   Keep dry. Keep diaper folded below umbilical cord. Circumcision Care:    Notify MD for redness, drainage or bleeding. Use Vaseline gauze over tip of penis for 1-3 days. Feeding: Breast feeding Physical Activity / Restrictions / Safety:  
    
Positioning: Position baby on his or her back while sleeping. Use a firm mattress. No Co Bedding. Car Seat: Car seat should be reclining, rear facing, and in the back seat of the car until 3years of age or has reached the rear facing weight limit of the seat. Notify Doctor For:  
 
Call your baby's doctor for the following:  
Fever over 100.3 degrees, taken Axillary or Rectally Yellow Skin color Increased irritability and / or sleepiness Wetting less than 5 diapers per day for formula fed babies Wetting less than 6 diapers per day once your breast milk is in, (at 117 days of age) Diarrhea or Vomiting Pain Management:  
 
Pain Management: Bundling, Patting, Dress Appropriately Follow-Up Care:  
 
Appointment with MD:  
Call your baby's doctors office on the next business day to make an appointment for baby's first office visit. Telephone [phone] Reviewed By: Kamlesh Whipple MD                                                                                                   Date: 2017 Time: 7:46 AM 
 
 
 
Discharge Orders None [facility] Announcement We are excited to announce that we are making your provider's discharge notes available to you in Linkurious. You will see these notes when they are completed and signed by the physician that discharged you from your recent hospital stay. If you have any questions or concerns about any information you see in [employer], please call the Health Information Department where you were seen or reach out to your Primary Care Provider for more information about your plan of care. Introducing [facility]! Dear Parent or Guardian, Thank you for requesting a Linkurious account for your child.   With Linkurious, you can view your childs hospital or ER discharge instructions, current allergies, immunizations and much more. In order to access your childs information, we require a signed consent on file. Please see the [facility] department or call [phone] for instructions on completing a [employer] Proxy request.   
Additional Information If you have questions, please visit the Frequently Asked Questions section of the [employer] website at [email or web address] Remember, [employer] is NOT to be used for urgent needs. For medical emergencies, dial 911. Now available from your iPhone and Android! General Information Please provide this summary of care documentation to your next provider. Patient Signature:  ____________________________________________________________ Date:  ____________________________________________________________  
  
Anthony Bar Provider Signature:  ____________________________________________________________ Date:  ____________________________________________________________

## 2020-02-07 NOTE — PROGRESS NOTES
"NEPHROLOGY PROGRESS NOTE------[facility]    [facility]  [phone]  Chris Corbin MD      Patient Care Team:  Nikki Rosado as PCP - Ronda Molina MD as Consulting Physician (Nephrology)      Provider:  Chris Corbin MD  Patient Name: Akila Bates  :  1950    SUBJECTIVE:    Patient breathing and feeling better. No angina. No dysuria    Medication:    alendronate 70 mg Oral Weekly   benzonatate 200 mg Oral 4x Daily   calcium-vitamin D 1 tablet Oral Daily   cefTRIAXone 1 g Intravenous Q24H   heparin (porcine) 5,000 Units Subcutaneous Q12H   predniSONE 40 mg Oral Daily With Breakfast   sodium chloride 10 mL Intravenous Q12H   vitamin B-12 500 mcg Oral Daily          OBJECTIVE    Vital Sign Min/Max for last 24 hours  Temp  Min: 97.7 °F (36.5 °C)  Max: 99.5 °F (37.5 °C)   BP  Min: 139/83  Max: 183/98   Pulse  Min: 82  Max: 101   Resp  Min: 22  Max: 26   SpO2  Min: 88 %  Max: 100 %   No data recorded   Weight  Min: 49.9 kg (110 lb)  Max: 54 kg (119 lb)     Flowsheet Rows      First Filed Value   Admission Height  154.9 cm (61\") Documented at 2020 1019   Admission Weight  49.9 kg (110 lb) Documented at 2020 1019          I/O this shift:  In: -   Out: 250 [Urine:250]  I/O last 3 completed shifts:  In: -   Out: 2370 [Urine:2370]    Physical Exam:  General Appearance: alert, appears stated age and cooperative  Head: normocephalic, without obvious abnormality and atraumatic  Eyes: conjunctivae and sclerae normal and no icterus  Neck: supple and no JVD  Lungs: +SCATTERED RHONCHI AND FEW END EXP WHEEZES  Heart: regular rhythm & normal rate and normal S1, S2 +JATINDER  Chest: Wall no abnormalities observed  Abdomen: normal bowel sounds and soft non-tender  Extremities: moves extremities well, no edema, no cyanosis and no redness +DJD  Skin: no bleeding, bruising or rash, turgor normal, color normal and no lesions " noted  Neurologic: Alert, and oriented. No focal deficits    Labs:    WBC WBC   Date Value Ref Range Status   02/07/2020 10.10 3.40 - 10.80 10*3/mm3 Final   02/06/2020 12.60 (H) 3.40 - 10.80 10*3/mm3 Final      HGB Hemoglobin   Date Value Ref Range Status   02/07/2020 12.7 12.0 - 15.9 g/dL Final   02/06/2020 14.4 12.0 - 15.9 g/dL Final      HCT Hematocrit   Date Value Ref Range Status   02/07/2020 37.2 34.0 - 46.6 % Final   02/06/2020 41.4 34.0 - 46.6 % Final      Platlets No results found for: LABPLAT   MCV MCV   Date Value Ref Range Status   02/07/2020 98.0 (H) 79.0 - 97.0 fL Final   02/06/2020 98.9 (H) 79.0 - 97.0 fL Final          Sodium Sodium   Date Value Ref Range Status   02/07/2020 133 (L) 136 - 145 mmol/L Final   02/06/2020 133 (L) 136 - 145 mmol/L Final   02/06/2020 127 (L) 136 - 145 mmol/L Final   02/06/2020 122 (L) 136 - 145 mmol/L Final      Potassium Potassium   Date Value Ref Range Status   02/07/2020 4.4 3.5 - 5.2 mmol/L Final   02/06/2020 4.5 3.5 - 5.2 mmol/L Final   02/06/2020 4.2 3.5 - 5.2 mmol/L Final   02/06/2020 4.1 3.5 - 5.2 mmol/L Final      Chloride Chloride   Date Value Ref Range Status   02/07/2020 99 98 - 107 mmol/L Final   02/06/2020 97 (L) 98 - 107 mmol/L Final   02/06/2020 91 (L) 98 - 107 mmol/L Final   02/06/2020 85 (L) 98 - 107 mmol/L Final      CO2 CO2   Date Value Ref Range Status   02/07/2020 25.0 22.0 - 29.0 mmol/L Final   02/06/2020 24.0 22.0 - 29.0 mmol/L Final   02/06/2020 23.0 22.0 - 29.0 mmol/L Final   02/06/2020 25.0 22.0 - 29.0 mmol/L Final      BUN BUN   Date Value Ref Range Status   02/07/2020 13 8 - 23 mg/dL Final   02/06/2020 12 8 - 23 mg/dL Final   02/06/2020 12 8 - 23 mg/dL Final   02/06/2020 12 8 - 23 mg/dL Final      Creatinine Creatinine   Date Value Ref Range Status   02/07/2020 0.63 0.57 - 1.00 mg/dL Final   02/06/2020 0.63 0.57 - 1.00 mg/dL Final   02/06/2020 0.58 0.57 - 1.00 mg/dL Final   02/06/2020 0.64 0.57 - 1.00 mg/dL Final      Calcium Calcium   Date  Value Ref Range Status   02/07/2020 9.1 8.6 - 10.5 mg/dL Final   02/06/2020 9.4 8.6 - 10.5 mg/dL Final   02/06/2020 8.8 8.6 - 10.5 mg/dL Final   02/06/2020 9.6 8.6 - 10.5 mg/dL Final      PO4 No components found for: PO4   Albumin Albumin   Date Value Ref Range Status   02/07/2020 3.90 3.50 - 5.20 g/dL Final   02/06/2020 4.60 3.50 - 5.20 g/dL Final      Magnesium Magnesium   Date Value Ref Range Status   02/07/2020 2.0 1.6 - 2.4 mg/dL Final      Uric Acid No components found for: URIC ACID     Imaging Results (Last 72 Hours)     Procedure Component Value Units Date/Time    CT Chest Without Contrast [[id number]] Collected:  02/06/20 2328     Updated:  02/06/20 2341    Narrative:       EXAM:CT CHEST WO CONTRAST-     DATE OF EXAM: 2/6/2020 8:35 PM     INDICATION: Shortness of breath.  Shortness of air, cough, RSV     COMPARISON: Chest radiograph dated 02/06/2020     TECHNIQUE: Serial and axial CT images of the chest were obtained.  Reconstructions in the coronal and sagittal planes were performed.   Automated exposure control and iterative reconstruction methods were  used.     FINDINGS:  The visualized soft tissue structures at the base of the neck including  the thyroid appear within normal limits. There is no lower cervical or  axillary adenopathy. The heart size is normal. There is no pericardial  effusion. The aorta is normal in caliber without evidence of aneurysm  formation. The main pulmonary artery is normal in caliber. There is no  mediastinal or hilar lymphadenopathy by size criteria. The esophagus is  normal in course and caliber.     The tracheobronchial tree is patent. There is bronchial wall thickening  and areas of mucous plugging within the bilateral lower lobes. There is  mild tree in bud nodularity within the left lower lobe likely  representing infectious or inflammatory bronchiolitis. There are no  discrete suspicious pulmonary nodules. There is no volume loss or  consolidation. There is mild  centrilobular emphysema with an upper lobe  predominance. There is no pleural effusion or pneumothorax.     Visualized portions of the upper abdomen demonstrate multiple  low-density hepatic cysts. There is an exophytic simple cyst arising  from the anterior aspect of the right kidney measuring 3.6 cm. There are  no acute osseous findings. There is degenerative disc disease of the  cervical spine.       Impression:       1. Bilateral lower lobe bronchial wall thickening with secretions within  the bronchioles. This can be seen with bronchitis or aspiration.  2. Tree in bud nodularity within the left lower lobe likely representing  infectious or inflammatory bronchiolitis.  3. Centrilobular emphysema with an upper lobe predominance.           Electronically Signed By-Michael Nicholson On:2/6/2020 11:39 PM  This report was finalized on [id number] by  Michael Nicholson, .    XR Chest 1 View [[id number]] Collected:  02/06/20 1201     Updated:  02/06/20 1209    Narrative:       XR CHEST 1 VW-     Date of Exam: 2/6/2020 11:22 AM     Indication: Simple Sepsis Triage Protocol.  Wheezing. Chest pain.  Shortness of breath. Intermittent cough.      Comparison Exams: None available.     Technique: Portable AP view of the chest     FINDINGS:  No consolidations or pleural effusions are observed. Chronic appearing  changes are noted. The cardiac silhouette is within normal limits for  size. The mediastinum is unremarkable. No acute osseous abnormalities  are seen.       Impression:       1.No evidence for acute cardiopulmonary process.     Electronically Signed ByMaggie Whiteside On:2/6/2020 12:02 PM  This report was finalized on [id number] by  Bennie Whiteside, .          Results for orders placed during the hospital encounter of 02/06/20   XR Chest 1 View    Narrative XR CHEST 1 VW-     Date of Exam: 2/6/2020 11:22 AM     Indication: Simple Sepsis Triage Protocol.  Wheezing. Chest pain.  Shortness of breath. Intermittent cough.       Comparison Exams: None available.     Technique: Portable AP view of the chest     FINDINGS:  No consolidations or pleural effusions are observed. Chronic appearing  changes are noted. The cardiac silhouette is within normal limits for  size. The mediastinum is unremarkable. No acute osseous abnormalities  are seen.       Impression 1.No evidence for acute cardiopulmonary process.     Electronically Signed By-Bennie Whiteside On:2/6/2020 12:02 PM  This report was finalized on [id number] by  Bennie Whiteside, .              ASSESSMENT / PLAN      RSV (acute bronchiolitis due to respiratory syncytial virus)    SIADH (syndrome of inappropriate ADH production) (CMS/[facility])      1. HYPONATREMIA/SIADH-----Much better. Liberalize po fluid restriction. D/C Seizure and fall risk precautions     2. RSV/BRONCHITIS/COUGH/ACUTE EXACERBATION OF COPD AND EMPHYSEMA---- Contact and droplet precautions. On azithromycin until cultures negative. Continue steroids and nebulizers.      3. OSTEOPOROSIS --- Alendronate along with calcium and Vitamin D supplements.      4. ANXIETY/DEPRESSION---  Continue Trazodone but discontinue Celexa as mentioned above. PRN ativan. PRN hydralazine for BP spikes.       5.  TOBACCO ABUSE--- No nicotine patch due to low sodium.      6. GENERALIZED OSTEOARTHRITIS---No NSAIDs      7. BNP NORMAL     8. OA/DJD     9. HTN-------PRN IV Hydralazine and follow      Chris Corbin MD  [facility]  [phone]  02/07/20  7:56 AM

## 2020-02-07 NOTE — NURSING NOTE
Pt fluid intake was increased to 1500cc. Still educated that her intake and output are both being monitored closely.

## 2020-02-08 VITALS
DIASTOLIC BLOOD PRESSURE: 77 MMHG | SYSTOLIC BLOOD PRESSURE: 131 MMHG | TEMPERATURE: 98.1 F | BODY MASS INDEX: 20.05 KG/M2 | RESPIRATION RATE: 16 BRPM | HEART RATE: 81 BPM | WEIGHT: 106.2 LBS | HEIGHT: 61 IN | OXYGEN SATURATION: 96 %

## 2020-02-08 LAB
ALBUMIN SERPL-MCNC: 3.6 G/DL (ref 3.5–5.2)
ALBUMIN/GLOB SERPL: 1.6 G/DL
ALP SERPL-CCNC: 60 U/L (ref 39–117)
ALT SERPL W P-5'-P-CCNC: 17 U/L (ref 1–33)
ANION GAP SERPL CALCULATED.3IONS-SCNC: 10 MMOL/L (ref 5–15)
AST SERPL-CCNC: 34 U/L (ref 1–32)
BASOPHILS # BLD AUTO: 0 10*3/MM3 (ref 0–0.2)
BASOPHILS NFR BLD AUTO: 0.3 % (ref 0–1.5)
BILIRUB SERPL-MCNC: 0.2 MG/DL (ref 0.2–1.2)
BUN BLD-MCNC: 13 MG/DL (ref 8–23)
BUN/CREAT SERPL: 19.4 (ref 7–25)
CA-I SERPL ISE-MCNC: 1.25 MMOL/L (ref 1.2–1.3)
CALCIUM SPEC-SCNC: 8.7 MG/DL (ref 8.6–10.5)
CHLORIDE SERPL-SCNC: 99 MMOL/L (ref 98–107)
CO2 SERPL-SCNC: 26 MMOL/L (ref 22–29)
CREAT BLD-MCNC: 0.67 MG/DL (ref 0.57–1)
DEPRECATED RDW RBC AUTO: 45.1 FL (ref 37–54)
EOSINOPHIL # BLD AUTO: 0 10*3/MM3 (ref 0–0.4)
EOSINOPHIL NFR BLD AUTO: 0.2 % (ref 0.3–6.2)
ERYTHROCYTE [DISTWIDTH] IN BLOOD BY AUTOMATED COUNT: 13 % (ref 12.3–15.4)
GFR SERPL CREATININE-BSD FRML MDRD: 87 ML/MIN/1.73
GLOBULIN UR ELPH-MCNC: 2.3 GM/DL
GLUCOSE BLD-MCNC: 105 MG/DL (ref 65–99)
HCT VFR BLD AUTO: 35.6 % (ref 34–46.6)
HGB BLD-MCNC: 12.5 G/DL (ref 12–15.9)
LYMPHOCYTES # BLD AUTO: 1.1 10*3/MM3 (ref 0.7–3.1)
LYMPHOCYTES NFR BLD AUTO: 10.9 % (ref 19.6–45.3)
MAGNESIUM SERPL-MCNC: 2 MG/DL (ref 1.6–2.4)
MCH RBC QN AUTO: 34.6 PG (ref 26.6–33)
MCHC RBC AUTO-ENTMCNC: 35 G/DL (ref 31.5–35.7)
MCV RBC AUTO: 98.9 FL (ref 79–97)
MONOCYTES # BLD AUTO: 1.5 10*3/MM3 (ref 0.1–0.9)
MONOCYTES NFR BLD AUTO: 14.3 % (ref 5–12)
NEUTROPHILS # BLD AUTO: 7.6 10*3/MM3 (ref 1.7–7)
NEUTROPHILS NFR BLD AUTO: 74.3 % (ref 42.7–76)
NRBC BLD AUTO-RTO: 0 /100 WBC (ref 0–0.2)
PHOSPHATE SERPL-MCNC: 2.5 MG/DL (ref 2.5–4.5)
PLATELET # BLD AUTO: 271 10*3/MM3 (ref 140–450)
PMV BLD AUTO: 6.8 FL (ref 6–12)
POTASSIUM BLD-SCNC: 4 MMOL/L (ref 3.5–5.2)
PROT SERPL-MCNC: 5.9 G/DL (ref 6–8.5)
RBC # BLD AUTO: 3.6 10*6/MM3 (ref 3.77–5.28)
SODIUM BLD-SCNC: 135 MMOL/L (ref 136–145)
WBC NRBC COR # BLD: 10.2 10*3/MM3 (ref 3.4–10.8)

## 2020-02-08 PROCEDURE — 63710000001 PREDNISONE PER 1 MG: Performed by: PHYSICIAN ASSISTANT

## 2020-02-08 PROCEDURE — 80053 COMPREHEN METABOLIC PANEL: CPT | Performed by: INTERNAL MEDICINE

## 2020-02-08 PROCEDURE — 83735 ASSAY OF MAGNESIUM: CPT | Performed by: INTERNAL MEDICINE

## 2020-02-08 PROCEDURE — 82330 ASSAY OF CALCIUM: CPT | Performed by: INTERNAL MEDICINE

## 2020-02-08 PROCEDURE — 85025 COMPLETE CBC W/AUTO DIFF WBC: CPT | Performed by: PHYSICIAN ASSISTANT

## 2020-02-08 PROCEDURE — 25010000002 HEPARIN (PORCINE) PER 1000 UNITS: Performed by: INTERNAL MEDICINE

## 2020-02-08 PROCEDURE — 84100 ASSAY OF PHOSPHORUS: CPT | Performed by: INTERNAL MEDICINE

## 2020-02-08 PROCEDURE — 25010000002 CEFTRIAXONE PER 250 MG: Performed by: INTERNAL MEDICINE

## 2020-02-08 PROCEDURE — 99239 HOSP IP/OBS DSCHRG MGMT >30: CPT | Performed by: INTERNAL MEDICINE

## 2020-02-08 RX ORDER — INHALER, ASSIST DEVICES
1 SPACER (EA) MISCELLANEOUS 4 TIMES DAILY PRN
Qty: 1 EACH | Refills: 0 | Status: SHIPPED | OUTPATIENT
Start: 2020-02-08 | End: 2021-02-13

## 2020-02-08 RX ORDER — ALBUTEROL SULFATE 90 UG/1
2 AEROSOL, METERED RESPIRATORY (INHALATION) EVERY 4 HOURS PRN
Qty: 1 INHALER | Refills: 0 | Status: SHIPPED | OUTPATIENT
Start: 2020-02-08 | End: 2020-03-09

## 2020-02-08 RX ORDER — AMOXICILLIN AND CLAVULANATE POTASSIUM 875; 125 MG/1; MG/1
1 TABLET, FILM COATED ORAL 2 TIMES DAILY
Qty: 8 TABLET | Refills: 0 | Status: SHIPPED | OUTPATIENT
Start: 2020-02-08

## 2020-02-08 RX ORDER — CHOLECALCIFEROL (VITAMIN D3) 125 MCG
10 CAPSULE ORAL NIGHTLY PRN
Start: 2020-02-08

## 2020-02-08 RX ORDER — GUAIFENESIN AND CODEINE PHOSPHATE 100; 10 MG/5ML; MG/5ML
5 SOLUTION ORAL EVERY 4 HOURS PRN
Qty: 240 ML | Refills: 0 | Status: SHIPPED | OUTPATIENT
Start: 2020-02-08

## 2020-02-08 RX ORDER — MIRTAZAPINE 15 MG/1
15 TABLET, FILM COATED ORAL NIGHTLY
Qty: 30 TABLET | Refills: 0 | Status: SHIPPED | OUTPATIENT
Start: 2020-02-08 | End: 2020-03-09

## 2020-02-08 RX ORDER — CALCIUM CARBONATE 200(500)MG
2 TABLET,CHEWABLE ORAL 2 TIMES DAILY PRN
Start: 2020-02-08

## 2020-02-08 RX ORDER — PREDNISONE 20 MG/1
40 TABLET ORAL
Qty: 10 TABLET | Refills: 0 | Status: SHIPPED | OUTPATIENT
Start: 2020-02-09 | End: 2020-02-14

## 2020-02-08 RX ORDER — PREDNISONE 10 MG/1
10 TABLET ORAL TAKE AS DIRECTED
Start: 2020-02-13

## 2020-02-08 RX ADMIN — HEPARIN SODIUM 5000 UNITS: 5000 INJECTION INTRAVENOUS; SUBCUTANEOUS at 09:59

## 2020-02-08 RX ADMIN — LORAZEPAM 0.5 MG: 0.5 TABLET ORAL at 17:08

## 2020-02-08 RX ADMIN — PREDNISONE 40 MG: 20 TABLET ORAL at 09:00

## 2020-02-08 RX ADMIN — OYSTER SHELL CALCIUM WITH VITAMIN D 1 TABLET: 500; 200 TABLET, FILM COATED ORAL at 10:00

## 2020-02-08 RX ADMIN — CYANOCOBALAMIN TAB 250 MCG 500 MCG: 250 TAB at 09:59

## 2020-02-08 RX ADMIN — Medication 10 ML: at 10:00

## 2020-02-08 RX ADMIN — GUAIFENESIN AND CODEINE PHOSPHATE 5 ML: 100; 10 SOLUTION ORAL at 17:08

## 2020-02-08 RX ADMIN — BENZONATATE 200 MG: 100 CAPSULE ORAL at 13:00

## 2020-02-08 RX ADMIN — BENZONATATE 200 MG: 100 CAPSULE ORAL at 09:00

## 2020-02-08 RX ADMIN — CEFTRIAXONE SODIUM 1 G: 1 INJECTION, POWDER, FOR SOLUTION INTRAMUSCULAR; INTRAVENOUS at 12:00

## 2020-02-08 NOTE — PROGRESS NOTES
"NEPHROLOGY PROGRESS NOTE------[facility]    [facility]  [phone]  Chris Corbin MD      Patient Care Team:  Nikki Rosado as PCP - Ronda Molina MD as Consulting Physician (Nephrology)      Provider:  Chris Corbin MD  Patient Name: Akila Bates  :  1950    SUBJECTIVE:    Patient breathing and feeling better. No angina. No dysuria    Medication:    alendronate 70 mg Oral Weekly   benzonatate 200 mg Oral 4x Daily   calcium-vitamin D 1 tablet Oral Daily   cefTRIAXone 1 g Intravenous Q24H   heparin (porcine) 5,000 Units Subcutaneous Q12H   predniSONE 40 mg Oral Daily With Breakfast   sodium chloride 10 mL Intravenous Q12H   vitamin B-12 500 mcg Oral Daily          OBJECTIVE    Vital Sign Min/Max for last 24 hours  Temp  Min: 98 °F (36.7 °C)  Max: 98.7 °F (37.1 °C)   BP  Min: 130/72  Max: 138/83   Pulse  Min: 72  Max: 82   Resp  Min: 17  Max: 22   SpO2  Min: 98 %  Max: 99 %   No data recorded   Weight  Min: 48.2 kg (106 lb 3.2 oz)  Max: 48.2 kg (106 lb 3.2 oz)     Flowsheet Rows      First Filed Value   Admission Height  154.9 cm (61\") Documented at 2020 1019   Admission Weight  49.9 kg (110 lb) Documented at 2020 1019          No intake/output data recorded.  I/O last 3 completed shifts:  In: 580 [P.O.:580]  Out: 2800 [Urine:2800]    Physical Exam:  General Appearance: alert, appears stated age and cooperative  Head: normocephalic, without obvious abnormality and atraumatic  Eyes: conjunctivae and sclerae normal and no icterus  Neck: supple and no JVD  Lungs: +SCATTERED RHONCHI AND FEW END EXP WHEEZES  Heart: regular rhythm & normal rate and normal S1, S2 +JATINDER  Chest: Wall no abnormalities observed  Abdomen: normal bowel sounds and soft non-tender  Extremities: moves extremities well, no edema, no cyanosis and no redness +DJD  Skin: no bleeding, bruising or rash, turgor normal, color normal and no lesions " noted  Neurologic: Alert, and oriented. No focal deficits    Labs:    WBC WBC   Date Value Ref Range Status   02/08/2020 10.20 3.40 - 10.80 10*3/mm3 Final   02/07/2020 10.10 3.40 - 10.80 10*3/mm3 Final   02/06/2020 12.60 (H) 3.40 - 10.80 10*3/mm3 Final      HGB Hemoglobin   Date Value Ref Range Status   02/08/2020 12.5 12.0 - 15.9 g/dL Final   02/07/2020 12.7 12.0 - 15.9 g/dL Final   02/06/2020 14.4 12.0 - 15.9 g/dL Final      HCT Hematocrit   Date Value Ref Range Status   02/08/2020 35.6 34.0 - 46.6 % Final   02/07/2020 37.2 34.0 - 46.6 % Final   02/06/2020 41.4 34.0 - 46.6 % Final      Platlets No results found for: LABPLAT   MCV MCV   Date Value Ref Range Status   02/08/2020 98.9 (H) 79.0 - 97.0 fL Final   02/07/2020 98.0 (H) 79.0 - 97.0 fL Final   02/06/2020 98.9 (H) 79.0 - 97.0 fL Final          Sodium Sodium   Date Value Ref Range Status   02/08/2020 135 (L) 136 - 145 mmol/L Final   02/07/2020 135 (L) 136 - 145 mmol/L Final   02/07/2020 133 (L) 136 - 145 mmol/L Final   02/06/2020 133 (L) 136 - 145 mmol/L Final   02/06/2020 127 (L) 136 - 145 mmol/L Final   02/06/2020 122 (L) 136 - 145 mmol/L Final      Potassium Potassium   Date Value Ref Range Status   02/08/2020 4.0 3.5 - 5.2 mmol/L Final   02/07/2020 4.2 3.5 - 5.2 mmol/L Final   02/07/2020 4.4 3.5 - 5.2 mmol/L Final   02/06/2020 4.5 3.5 - 5.2 mmol/L Final   02/06/2020 4.2 3.5 - 5.2 mmol/L Final   02/06/2020 4.1 3.5 - 5.2 mmol/L Final      Chloride Chloride   Date Value Ref Range Status   02/08/2020 99 98 - 107 mmol/L Final   02/07/2020 96 (L) 98 - 107 mmol/L Final   02/07/2020 99 98 - 107 mmol/L Final   02/06/2020 97 (L) 98 - 107 mmol/L Final   02/06/2020 91 (L) 98 - 107 mmol/L Final   02/06/2020 85 (L) 98 - 107 mmol/L Final      CO2 CO2   Date Value Ref Range Status   02/08/2020 26.0 22.0 - 29.0 mmol/L Final   02/07/2020 27.0 22.0 - 29.0 mmol/L Final   02/07/2020 25.0 22.0 - 29.0 mmol/L Final   02/06/2020 24.0 22.0 - 29.0 mmol/L Final   02/06/2020 23.0 22.0  - 29.0 mmol/L Final   02/06/2020 25.0 22.0 - 29.0 mmol/L Final      BUN BUN   Date Value Ref Range Status   02/08/2020 13 8 - 23 mg/dL Final   02/07/2020 15 8 - 23 mg/dL Final   02/07/2020 13 8 - 23 mg/dL Final   02/06/2020 12 8 - 23 mg/dL Final   02/06/2020 12 8 - 23 mg/dL Final   02/06/2020 12 8 - 23 mg/dL Final      Creatinine Creatinine   Date Value Ref Range Status   02/08/2020 0.67 0.57 - 1.00 mg/dL Final   02/07/2020 0.68 0.57 - 1.00 mg/dL Final   02/07/2020 0.63 0.57 - 1.00 mg/dL Final   02/06/2020 0.63 0.57 - 1.00 mg/dL Final   02/06/2020 0.58 0.57 - 1.00 mg/dL Final   02/06/2020 0.64 0.57 - 1.00 mg/dL Final      Calcium Calcium   Date Value Ref Range Status   02/08/2020 8.7 8.6 - 10.5 mg/dL Final   02/07/2020 9.2 8.6 - 10.5 mg/dL Final   02/07/2020 9.1 8.6 - 10.5 mg/dL Final   02/06/2020 9.4 8.6 - 10.5 mg/dL Final   02/06/2020 8.8 8.6 - 10.5 mg/dL Final   02/06/2020 9.6 8.6 - 10.5 mg/dL Final      PO4 No components found for: PO4   Albumin Albumin   Date Value Ref Range Status   02/08/2020 3.60 3.50 - 5.20 g/dL Final   02/07/2020 3.90 3.50 - 5.20 g/dL Final   02/06/2020 4.60 3.50 - 5.20 g/dL Final      Magnesium Magnesium   Date Value Ref Range Status   02/08/2020 2.0 1.6 - 2.4 mg/dL Final   02/07/2020 2.0 1.6 - 2.4 mg/dL Final      Uric Acid No components found for: URIC ACID     Imaging Results (Last 72 Hours)     Procedure Component Value Units Date/Time    CT Chest Without Contrast [[id number]] Collected:  02/06/20 2328     Updated:  02/06/20 2341    Narrative:       EXAM:CT CHEST WO CONTRAST-     DATE OF EXAM: 2/6/2020 8:35 PM     INDICATION: Shortness of breath.  Shortness of air, cough, RSV     COMPARISON: Chest radiograph dated 02/06/2020     TECHNIQUE: Serial and axial CT images of the chest were obtained.  Reconstructions in the coronal and sagittal planes were performed.   Automated exposure control and iterative reconstruction methods were  used.     FINDINGS:  The visualized soft tissue  structures at the base of the neck including  the thyroid appear within normal limits. There is no lower cervical or  axillary adenopathy. The heart size is normal. There is no pericardial  effusion. The aorta is normal in caliber without evidence of aneurysm  formation. The main pulmonary artery is normal in caliber. There is no  mediastinal or hilar lymphadenopathy by size criteria. The esophagus is  normal in course and caliber.     The tracheobronchial tree is patent. There is bronchial wall thickening  and areas of mucous plugging within the bilateral lower lobes. There is  mild tree in bud nodularity within the left lower lobe likely  representing infectious or inflammatory bronchiolitis. There are no  discrete suspicious pulmonary nodules. There is no volume loss or  consolidation. There is mild centrilobular emphysema with an upper lobe  predominance. There is no pleural effusion or pneumothorax.     Visualized portions of the upper abdomen demonstrate multiple  low-density hepatic cysts. There is an exophytic simple cyst arising  from the anterior aspect of the right kidney measuring 3.6 cm. There are  no acute osseous findings. There is degenerative disc disease of the  cervical spine.       Impression:       1. Bilateral lower lobe bronchial wall thickening with secretions within  the bronchioles. This can be seen with bronchitis or aspiration.  2. Tree in bud nodularity within the left lower lobe likely representing  infectious or inflammatory bronchiolitis.  3. Centrilobular emphysema with an upper lobe predominance.           Electronically Signed By-Michael Nicholson On:2/6/2020 11:39 PM  This report was finalized on [id number] by  Michael Nicholson, .    XR Chest 1 View [[id number]] Collected:  02/06/20 1201     Updated:  02/06/20 1209    Narrative:       XR CHEST 1 VW-     Date of Exam: 2/6/2020 11:22 AM     Indication: Simple Sepsis Triage Protocol.  Wheezing. Chest pain.  Shortness of breath.  Intermittent cough.      Comparison Exams: None available.     Technique: Portable AP view of the chest     FINDINGS:  No consolidations or pleural effusions are observed. Chronic appearing  changes are noted. The cardiac silhouette is within normal limits for  size. The mediastinum is unremarkable. No acute osseous abnormalities  are seen.       Impression:       1.No evidence for acute cardiopulmonary process.     Electronically Signed By-Bennie Whiteside On:2/6/2020 12:02 PM  This report was finalized on [id number] by  Bennie Whiteside, .          Results for orders placed during the hospital encounter of 02/06/20   XR Chest 1 View    Narrative XR CHEST 1 VW-     Date of Exam: 2/6/2020 11:22 AM     Indication: Simple Sepsis Triage Protocol.  Wheezing. Chest pain.  Shortness of breath. Intermittent cough.      Comparison Exams: None available.     Technique: Portable AP view of the chest     FINDINGS:  No consolidations or pleural effusions are observed. Chronic appearing  changes are noted. The cardiac silhouette is within normal limits for  size. The mediastinum is unremarkable. No acute osseous abnormalities  are seen.       Impression 1.No evidence for acute cardiopulmonary process.     Electronically Signed By-Bennie Whiteside On:2/6/2020 12:02 PM  This report was finalized on [id number] by  Bennie Whiteside, .              ASSESSMENT / PLAN      RSV (acute bronchiolitis due to respiratory syncytial virus)    SIADH (syndrome of inappropriate ADH production) (CMS/[facility])    Acute exacerbation of chronic obstructive pulmonary disease (COPD) (CMS/[facility])    At risk for secondary infection      1. HYPONATREMIA/SIADH-----Much better. Liberalized po fluid restriction. D/C Seizure and fall risk precautions     2. RSV/BRONCHITIS/COUGH/ACUTE EXACERBATION OF COPD AND EMPHYSEMA---- Contact and droplet precautions. On azithromycin until cultures negative. Continue steroids and nebulizers.      3. OSTEOPOROSIS --- Alendronate along  with calcium and Vitamin D supplements.      4. ANXIETY/DEPRESSION---  Continue Trazodone but discontinue Celexa as mentioned above. PRN ativan. PRN hydralazine for BP spikes.       5.  TOBACCO ABUSE--- No nicotine patch due to low sodium.      6. GENERALIZED OSTEOARTHRITIS---No NSAIDs      7. BNP NORMAL     8. OA/DJD     9. HTN-------PRN IV Hydralazine and follow    OK TO D/C PER RENAL STANDPOINT. FOLLOW UP IN 2 WEEKS.       Chris Corbin MD  [facility]  [phone]  02/08/20  8:30 AM

## 2020-02-08 NOTE — PROGRESS NOTES
Exercise Oximetry    Patient Name:Akila Bates   MRN: [id number]   Date: 02/08/20             ROOM AIR BASELINE   SpO2% 96   Heart Rate 117   Blood Pressure      EXERCISE ON ROOM AIR SpO2% EXERCISE ON O2 @  LPM SpO2%   1 MINUTE 96 1 MINUTE    2 MINUTES 94 2 MINUTES    3 MINUTES 93 3 MINUTES    4 MINUTES 90 4 MINUTES    5 MINUTES 92 5 MINUTES    6 MINUTES 92 6 MINUTES               Distance Walked   Distance Walked   Dyspnea (Meenu Scale)   Dyspnea (Meenu Scale)   Fatigue (Meenu Scale)   Fatigue (Meenu Scale)   SpO2% Post Exercise  93 SpO2% Post Exercise   HR Post Exercise  107 HR Post Exercise   Time to Recovery  5 min Time to Recovery     Comments: pt did not drop below 90% on room air during her 6 min walk.  Pt walked and talked the whole time only showing mild dyspnea.      Ayse Victor, GREGORIA  2/8/2020  2:48 PM

## 2020-02-08 NOTE — PLAN OF CARE
Problem: Patient Care Overview  Goal: Plan of Care Review  Outcome: Ongoing (interventions implemented as appropriate)  Flowsheets (Taken 2/8/2020 6556)  Progress: improving  Plan of Care Reviewed With: patient  Outcome Summary: pt states SOB is improving, Able to ambulate in room. will continue with current care plan

## 2020-02-08 NOTE — DISCHARGE SUMMARY
Date of Admission: 2/6/2020    Date of Discharge:  2/8/2020    Length of stay:  LOS: 2 days     Presenting Problem/History of Present Illness   Present on Admission:  • RSV (acute bronchiolitis due to respiratory syncytial virus)  • SIADH (syndrome of inappropriate ADH production) (CMS/[facility])  • Acute exacerbation of chronic obstructive pulmonary disease (COPD) (CMS/[facility])  • At risk for secondary infection        Hospital Course    Chief Complaint   Patient presents with   • Shortness of Breath       Ms. Bates is a 69 y.o. with a past medical history of anxiety/depression and HLD who presents to [facility] 2/6 complaining of dyspnea and weakness x 1 week, worsening over the last two days.  She states she was seen at urgent care 2 days ago and diagnosed with bronchitis.  She reports a dry cough, but denies known fever or chills. She denies known sick contacts.  She denies home oxygen use.       In the ED, the patient's labs included the following: Na 122, K 4.1, BUN 12, Cr 0.64, glucose 118, WBC 12.6, hgb 14.4, plts 307.  Troponin negative.  .  Lactate negative.  CXR: negative.  EKG: SR, HR 90.  Respiratory panel positive for RSV.  She was given breathing treatments, a dose of rocephin/zithromax and 1 L NS bolus in the ED.  She was admitted for further evaluation and management.   Rather rapid recovery with her sodium.  Respiratory status still improving. 02/08/20  11:55 AM      ROS  Constitution: Negative for chills and fever.   Cardiovascular: Negative for chest pain.   Respiratory: Positive for shortness of breath.    Gastrointestinal: Negative for nausea and vomiting.   Neurological: Positive for weakness. Negative for dizziness.   All other systems reviewed and are negative.       Past Medical History:     Past Medical History:   Diagnosis Date   • Osteoporosis        Past Surgical History:     Past Surgical History:   Procedure Laterality Date   • HYSTERECTOMY  1988       Social History:    Social History     Socioeconomic History   • Marital status: Single     Spouse name: Not on file   • Number of children: Not on file   • Years of education: Not on file   • Highest education level: Not on file   Tobacco Use   • Smoking status: Former Smoker     Packs/day: 1.00     Types: Cigarettes     Last attempt to quit: 2/6/2020   • Smokeless tobacco: Never Used   Substance and Sexual Activity   • Alcohol use: Not Currently   • Drug use: Never   • Sexual activity: Defer       Vital Signs  Temp:  [98 °F (36.7 °C)-98.7 °F (37.1 °C)] 98.1 °F (36.7 °C)  Heart Rate:  [72-82] 72  Resp:  [17-22] 17  BP: (130-138)/(72-83) 136/78    Physical Exam:  Physical Exam  Physical Exam   Constitutional: Patient appears well-developed and well-nourished and in no acute distress     HEENT:   Head: Normocephalic and atraumatic.   Eyes:  Pupils are equal, round, and reactive to light. EOM are intact. Sclera are anicteric and non-injected.  Mouth and Throat: Patient has moist mucous membranes. Oropharynx is clear of any erythema or exudate.       Neck: Neck supple.  No thyromegaly present. No lymphadenopathy present. No  masses.     Cardiovascular: Inspection: No JVD present. Palpation: No parasternal heave. Pedal pulses +1 bilaterally. No leg edema. Auscultation: Regular rate, regular rhythm, S1 normal and S2 normal. reveals no gallop and no friction rub. No Carotid bruit bilaterally.    Pulmonary/Chest: Inspection: No distress, no use of accessory muscles.  Extensive expiratory rhonchi bilaterally. No respiratory distress. No wheezes. No rales.     Abdomen /Gastrointestinal: Inspection: no distension. Palpation: no masses, no organomegaly. Soft. There is no tenderness. Bowel sounds are normal.     Musculoskeletal: Normal Muscle tone. Age appropriate, no deformities.    Neurological: Patient is alert and oriented to person, place, and time. Cranial nerves II-XII are grossly intact with no focal deficits. Sensori-motor exam is  normal. No cerebellar signs.    Skin: Skin is warm. No rash noted. Nails show no clubbing.  No cyanosis or erythema. No bruising.    Emotional Behavior:   Appropriate   Debilities:  Age appropriate  Exam unchanged from February 7      Discharge Diagnosis:     Active Hospital Problems    Diagnosis  POA   • **RSV (acute bronchiolitis due to respiratory syncytial virus) [J21.0]  Yes     Priority: High   • Acute exacerbation of chronic obstructive pulmonary disease (COPD) (CMS/[facility]) [J44.1]  Yes     Priority: Medium   • SIADH (syndrome of inappropriate ADH production) (CMS/[facility]) [E22.2]  Yes     Priority: Medium   • At risk for secondary infection [Z91.89]  Yes      Resolved Hospital Problems   No resolved problems to display.       Estimated Creatinine Clearance: 49.1 mL/min (by C-G formula based on SCr of 0.67 mg/dL).    Discharge Disposition    Destination      Coordination has not been started for this encounter.      Durable Medical Equipment      Coordination has not been started for this encounter.      Dialysis/Infusion      Coordination has not been started for this encounter.      Home Medical Care      Coordination has not been started for this encounter.      Therapy      Coordination has not been started for this encounter.      Community Resources      Coordination has not been started for this encounter.              PT Recommendation and Plan          Home or Self Care           Discharge Medications      New Medications      Instructions Start Date   albuterol sulfate  (90 Base) MCG/ACT inhaler  Commonly known as:  PROVENTIL HFA;VENTOLIN HFA;PROAIR HFA   2 puffs, Inhalation, Every 4 Hours PRN      amoxicillin-clavulanate 875-125 MG per tablet  Commonly known as:  AUGMENTIN   1 tablet, Oral, 2 Times Daily      BREATHERITE EM SPACER ADULT misc   1 Device, Does not apply, 4 Times Daily PRN, Use with Proventil      calcium carbonate 500 MG chewable tablet  Commonly known as:  TUMS   2 tablets, Oral,  2 Times Daily PRN      guaiFENesin-codeine 100-10 MG/5ML liquid  Commonly known as:  GUAIFENESIN AC   5 mL, Oral, Every 4 Hours PRN      melatonin 5 MG tablet tablet   10 mg, Oral, Nightly PRN      mirtazapine 15 MG tablet  Commonly known as:  REMERON   15 mg, Oral, Nightly      umeclidinium-vilanterol 62.5-25 MCG/INH aerosol powder  inhaler  Commonly known as:  ANORO ELLIPTA   1 puff, Inhalation, Daily - RT         Changes to Medications      Instructions Start Date   predniSONE 20 MG tablet  Commonly known as:  DELTASONE  What changed:  You were already taking a medication with the same name, and this prescription was added. Make sure you understand how and when to take each.   40 mg, Oral, Daily With Breakfast   Start Date:  February 9, 2020     predniSONE 10 MG tablet  Commonly known as:  DELTASONE  What changed:  These instructions start on February 13, 2020. If you are unsure what to do until then, ask your doctor or other care provider.   10 mg, Oral, Take As Directed   Start Date:  February 13, 2020        Continue These Medications      Instructions Start Date   alendronate 70 MG tablet  Commonly known as:  FOSAMAX   70 mg, Oral, Every 7 Days, Every Thursday      benzonatate 100 MG capsule  Commonly known as:  TESSALON   100 mg, Oral, 3 Times Daily PRN      calcium-vitamin D 500-200 MG-UNIT per tablet   1 tablet, Oral, Daily      vitamin B-12 500 MCG tablet  Commonly known as:  CYANOCOBALAMIN   500 mcg, Oral, Daily         Stop These Medications    citalopram 40 MG tablet  Commonly known as:  CeleXA     doxycycline 100 MG capsule  Commonly known as:  VIBRAMYCIN     traZODone 50 MG tablet  Commonly known as:  DESYREL                Consults:   Consults     Date and Time Order Name Status Description    2/6/2020 1532 Inpatient Nephrology Consult Completed           Procedures Performed:         Pertinent Test Results:     I reviewed the patient's new clinical results.    Results from last 7 days   Lab Units  02/08/20  0602 02/07/20  0437 02/06/20  1046   WBC 10*3/mm3 10.20 10.10 12.60*   HEMOGLOBIN g/dL 12.5 12.7 14.4   HEMATOCRIT % 35.6 37.2 41.4   PLATELETS 10*3/mm3 271 282 307     Results from last 7 days   Lab Units 02/08/20  0602 02/07/20  1150 02/07/20  0437  02/06/20  1046   SODIUM mmol/L 135* 135* 133*   < > 122*   POTASSIUM mmol/L 4.0 4.2 4.4   < > 4.1   CHLORIDE mmol/L 99 96* 99   < > 85*   CO2 mmol/L 26.0 27.0 25.0   < > 25.0   BUN mg/dL 13 15 13   < > 12   CREATININE mg/dL 0.67 0.68 0.63   < > 0.64   CALCIUM mg/dL 8.7 9.2 9.1   < > 9.6   BILIRUBIN mg/dL 0.2  --  0.2  --  0.3   ALK PHOS U/L 60  --  62  --  78   ALT (SGPT) U/L 17  --  17  --  18   AST (SGOT) U/L 34*  --  41*  --  39*   GLUCOSE mg/dL 105* 128* 123*   < > 118*    < > = values in this interval not displayed.     Results from last 7 days   Lab Units 02/08/20  0602 02/07/20  0437   MAGNESIUM mg/dL 2.0 2.0     Lab Results   Component Value Date    CALCIUM 8.7 02/08/2020    PHOS 2.5 02/08/2020     No results found for: HGBA1C  No results found for: CHOL, CHLPL, TRIG, HDL, LDL, LDLDIRECT  No results found for: LIPASE  Results from last 7 days   Lab Units 02/06/20  1113   PH, ARTERIAL pH units 7.397   PO2 ART mm Hg 81.9*   PCO2, ARTERIAL mm Hg 38.1   HCO3 ART mmol/L 23.4       No results found for: INTRAOP, PREDX, FINALDX, COMDX    Microbiology Results (last 10 days)     Procedure Component Value - Date/Time    Legionella Antigen, Urine - Urine, Urine, Clean Catch [[id number]]  (Normal) Collected:  02/06/20 1523    Lab Status:  Final result Specimen:  Urine, Clean Catch Updated:  02/06/20 1723     LEGIONELLA ANTIGEN, URINE Negative    Blood Culture - Blood, Arm, Left [[id number]] Collected:  02/06/20 1105    Lab Status:  Preliminary result Specimen:  Blood from Arm, Left Updated:  02/08/20 1116     Blood Culture No growth at 2 days    Respiratory Panel, PCR - Swab, Nasopharynx [[id number]]  (Abnormal) Collected:  02/06/20 1053    Lab Status:  Final result  Specimen:  Swab from Nasopharynx Updated:  02/06/20 1240     ADENOVIRUS, PCR Not Detected     Coronavirus 229E Not Detected     Coronavirus HKU1 Not Detected     Coronavirus NL63 Not Detected     Coronavirus OC43 Not Detected     Human Metapneumovirus Not Detected     Human Rhinovirus/Enterovirus Not Detected     Influenza B PCR Not Detected     Parainfluenza Virus 1 Not Detected     Parainfluenza Virus 2 Not Detected     Parainfluenza Virus 3 Not Detected     Parainfluenza Virus 4 Not Detected     Bordetella pertussis pcr Not Detected     Influenza A H1 2009 PCR Not Detected     Chlamydophila pneumoniae PCR Not Detected     Mycoplasma pneumo by PCR Not Detected     Influenza A PCR Not Detected     Influenza A H3 Not Detected     Influenza A H1 Not Detected     RSV, PCR Detected    Blood Culture - Blood, Arm, Right [[id number]] Collected:  02/06/20 1046    Lab Status:  Preliminary result Specimen:  Blood from Arm, Right Updated:  02/08/20 1116     Blood Culture No growth at 2 days          ECG/EMG Results (most recent)     Procedure Component Value Units Date/Time    ECG 12 Lead [[id number]] Collected:  02/06/20 1101     Updated:  02/07/20 0703    Narrative:       HEART RATE= 90  bpm  RR Interval= 668  ms  LA Interval= 154  ms  P Horizontal Axis= 13  deg  P Front Axis= 37  deg  QRSD Interval= 80  ms  QT Interval= 343  ms  QRS Axis= 52  deg  T Wave Axis= 57  deg  - BORDERLINE ECG -  Sinus rhythm  Consider anterior infarct  No previous ECG available for comparison  Electronically Signed By: Pavan Lombardi (Christoph) 07-Feb-2020 07:03:44  Date and Time of Study: 2020-02-06 11:01:22                    Ct Chest Without Contrast    Result Date: 2/6/2020  1. Bilateral lower lobe bronchial wall thickening with secretions within the bronchioles. This can be seen with bronchitis or aspiration. 2. Tree in bud nodularity within the left lower lobe likely representing infectious or inflammatory bronchiolitis. 3. Centrilobular emphysema  with an upper lobe predominance.    Electronically Signed By-Michael Nicholson On:2/6/2020 11:39 PM This report was finalized on [id number] by  Michael Nicholson, .    Xr Chest 1 View    Result Date: 2/6/2020  1.No evidence for acute cardiopulmonary process.  Electronically Signed By-Bennie Whiteside On:2/6/2020 12:02 PM This report was finalized on [id number] by  Bennie Whiteside, .      Xrays, labs reviewed personally by physician.    Condition on Discharge:    Stable    Discharge Diet:   Dietary Orders (From admission, onward)     Start     Ordered    02/08/20 0628  Diet Regular  Diet Effective Now     Question:  Diet / Texture / Consistency  Answer:  Regular    02/08/20 0628                Activity at Discharge:   Activity Instructions     Activity as Tolerated            Follow-up Appointments  No future appointments.  Additional Instructions for the Follow-ups that You Need to Schedule     Discharge Follow-up with PCP   As directed       Currently Documented PCP:    Nikki Rosado    PCP Phone Number:    [phone]     Follow Up Details:  If no PCP, call MD finder at [phone]         Discharge Follow-up with Specified Provider: Dr. Corbin; 2 Weeks   As directed      To:  Dr. Corbin    Follow Up:  2 Weeks         Basic Metabolic Panel    Feb 15, 2020 (Approximate)            Test Results Pending at Discharge   Order Current Status    Protein Elec + Interp, Serum In process    Blood Culture - Blood, Arm, Left Preliminary result    Blood Culture - Blood, Arm, Right Preliminary result           Risk for Readmission (LACE) Score: 7 (2/8/2020  6:00 AM)          Rafal Willett MD  02/08/20  11:53 AM    Time: Greater than 30 minutes in discharge activity for care coordination with Dr. Corbin regarding her sodium.  Discussed with patient at length regarding discharge plan and medications.  Discussed with nursing for discharge plan. 02/08/20  11:56 AM

## 2020-02-09 ENCOUNTER — READMISSION MANAGEMENT (OUTPATIENT)
Dept: CALL CENTER | Facility: HOSPITAL | Age: 70
End: 2020-02-09

## 2020-02-09 NOTE — OUTREACH NOTE
Prep Survey      Responses   Facility patient discharged from?  [facility]   Is patient eligible?  Yes   Discharge diagnosis  RSV,    Acute exacerbation of chronic obstructive pulmonary disease,    SIADH (syndrome of inappropriate ADH production)     Does the patient have one of the following disease processes/diagnoses(primary or secondary)?  COPD/Pneumonia   Does the patient have Home health ordered?  No   Is there a DME ordered?  No   Prep survey completed?  Yes          Giovanna Royal RN

## 2020-02-10 LAB
ALBUMIN SERPL-MCNC: 3.4 G/DL (ref 2.9–4.4)
ALBUMIN/GLOB SERPL: 1.3 {RATIO} (ref 0.7–1.7)
ALPHA1 GLOB FLD ELPH-MCNC: 0.3 G/DL (ref 0–0.4)
ALPHA2 GLOB SERPL ELPH-MCNC: 0.8 G/DL (ref 0.4–1)
B-GLOBULIN SERPL ELPH-MCNC: 0.9 G/DL (ref 0.7–1.3)
GAMMA GLOB SERPL ELPH-MCNC: 0.6 G/DL (ref 0.4–1.8)
GLOBULIN SER CALC-MCNC: 2.6 G/DL (ref 2.2–3.9)
Lab: NORMAL
M-SPIKE: NORMAL G/DL
PROT PATTERN SERPL ELPH-IMP: NORMAL
PROT SERPL-MCNC: 6 G/DL (ref 6–8.5)

## 2020-02-11 ENCOUNTER — READMISSION MANAGEMENT (OUTPATIENT)
Dept: CALL CENTER | Facility: HOSPITAL | Age: 70
End: 2020-02-11

## 2020-02-11 LAB
BACTERIA SPEC AEROBE CULT: NORMAL
BACTERIA SPEC AEROBE CULT: NORMAL

## 2020-02-11 NOTE — OUTREACH NOTE
COPD/PN Week 1 Survey      Responses   Facility patient discharged from?  [facility]   Does the patient have one of the following disease processes/diagnoses(primary or secondary)?  COPD/Pneumonia   Is there a successful TCM telephone encounter documented?  No   Was the primary reason for admission:  COPD exacerbation   Week 1 attempt successful?  Yes   Call start time  1052   Call end time  1105   Discharge diagnosis  RSV,    Acute exacerbation of chronic obstructive pulmonary disease,    SIADH (syndrome of inappropriate ADH production)     Is patient permission given to speak with other caregiver?  No   Meds reviewed with patient/caregiver?  Yes   Is the patient having any side effects they believe may be caused by any medication additions or changes?  No   Does the patient have all medications ordered at discharge?  Yes   Is the patient taking all medications as directed (includes completed medication regime)?  Yes   Does the patient have a primary care provider?   Yes   Does the patient have an appointment with their PCP or pulmonologist within 7 days of discharge?  No   Comments regarding PCP  Nikki Rosado PCP   What is preventing the patient from scheduling follow up appointments within 7 days of discharge?  -- [Patient states that she does not intend to follow up with PCP. ]   Nursing Interventions  Educated patient on importance of making appointment   Has the patient kept scheduled appointments due by today?  N/A   Comments  Patient states that she will follow up with Dr Andres.    Has home health visited the patient within 72 hours of discharge?  N/A   Psychosocial issues?  No   Did the patient receive a copy of their discharge instructions?  Yes   Nursing interventions  Reviewed instructions with patient   What is the patient's perception of their health status since discharge?  Improving   Is the patient/caregiver able to teach back the hierarchy of who to call/visit for symptoms/problems? PCP  Specialist, Home health nurse, Urgent Care, ED, 911  Yes   Week 1 call completed?  Yes          Emerald Keith RN

## 2020-02-18 ENCOUNTER — READMISSION MANAGEMENT (OUTPATIENT)
Dept: CALL CENTER | Facility: HOSPITAL | Age: 70
End: 2020-02-18

## 2020-02-18 NOTE — OUTREACH NOTE
COPD/PN Week 2 Survey      Responses   Facility patient discharged from?  [facility]   Does the patient have one of the following disease processes/diagnoses(primary or secondary)?  COPD/Pneumonia   Was the primary reason for admission:  COPD exacerbation   Week 2 attempt successful?  Yes   Call start time  1240   Call end time  1243   Discharge diagnosis  RSV,    Acute exacerbation of chronic obstructive pulmonary disease,    SIADH (syndrome of inappropriate ADH production)     Meds reviewed with patient/caregiver?  Yes   Is the patient having any side effects they believe may be caused by any medication additions or changes?  No   Does the patient have all medications ordered at discharge?  Yes   Is the patient taking all medications as directed (includes completed medication regime)?  Yes   Medication comments  States she has finished her antibiotics   Does the patient have a primary care provider?   Yes   Does the patient have an appointment with their PCP or pulmonologist within 7 days of discharge?  No   Nursing Interventions  Educated patient on importance of making appointment   Has the patient kept scheduled appointments due by today?  N/A   Comments  Patient states she isn't following up with her PCP but is going to see Dr. Arias   Has home health visited the patient within 72 hours of discharge?  N/A   Did the patient receive a copy of their discharge instructions?  Yes   Nursing interventions  Reviewed instructions with patient   What is the patient's perception of their health status since discharge?  Improving   Is the patient/caregiver able to teach back the hierarchy of who to call/visit for symptoms/problems? PCP, Specialist, Home health nurse, Urgent Care, ED, 911  Yes   Additional teach back comments  Patient states she is doing much better.  Has finished her antibiotics and she had labs draw done and waiting on the results from Dr. arias.     Is the patient able to teach back COPD zones?  Yes    Patient reports what zone on this call?  Green Zone   Green Zone  Reports doing well, Breathing without shortness of breath, Usual activity and exercise level, Usual amount of phlegm/mucus without difficulty coughing up, Sleeping well, Appetite is good   Green Zone interventions:  Take daily medications, Continue regular exercise/diet plan   Week 2 call completed?  Yes   Wrap up additional comments  No questions or needs at this time          Brooke Palumbo LPN

## 2020-02-25 ENCOUNTER — READMISSION MANAGEMENT (OUTPATIENT)
Dept: CALL CENTER | Facility: HOSPITAL | Age: 70
End: 2020-02-25

## 2020-02-25 NOTE — OUTREACH NOTE
COPD/PN Week 3 Survey      Responses   Facility patient discharged from?  [facility]   Does the patient have one of the following disease processes/diagnoses(primary or secondary)?  COPD/Pneumonia   Was the primary reason for admission:  COPD exacerbation   Week 3 attempt successful?  Yes   Call start time  1134   Call end time  1139   Meds reviewed with patient/caregiver?  Yes   Is the patient having any side effects they believe may be caused by any medication additions or changes?  No   Does the patient have all medications ordered at discharge?  Yes   Is the patient taking all medications as directed (includes completed medication regime)?  Yes   Does the patient have a primary care provider?   Yes   Does the patient have an appointment with their PCP or pulmonologist within 7 days of discharge?  Yes   Comments regarding PCP  [facility] DENIES NEED FOR A PCP FOLLOW UP   Has the patient kept scheduled appointments due by today?  N/A   Has home health visited the patient within 72 hours of discharge?  N/A   Did the patient receive a copy of their discharge instructions?  Yes   Nursing interventions  Reviewed instructions with patient   What is the patient's perception of their health status since discharge?  Improving   Is the patient/caregiver able to teach back the hierarchy of who to call/visit for symptoms/problems? PCP, Specialist, Home health nurse, Urgent Care, ED, 911  Yes   Is the patient able to teach back COPD zones?  Yes   Nursing interventions  Education provided on various zones   Patient reports what zone on this call?  Green Zone   Green Zone  Reports doing well, Breathing without shortness of breath, Usual activity and exercise level, Usual amount of phlegm/mucus without difficulty coughing up, Sleeping well, Appetite is good   Green Zone interventions:  Take daily medications, Avoid indoor/outdoor triggers, Continue regular exercise/diet plan   Week 3 call completed?  Yes   Revoked  No further  contact(revokes)-requires comment   Graduated/Revoked comments  PATIENT VOICES DOING WELL, AND DECLINES NEED FOR FUTURE CALLS.           Sera Gan LPN